# Patient Record
Sex: FEMALE | Race: BLACK OR AFRICAN AMERICAN | NOT HISPANIC OR LATINO | URBAN - METROPOLITAN AREA
[De-identification: names, ages, dates, MRNs, and addresses within clinical notes are randomized per-mention and may not be internally consistent; named-entity substitution may affect disease eponyms.]

---

## 2017-01-25 ENCOUNTER — INPATIENT (INPATIENT)
Facility: HOSPITAL | Age: 47
LOS: 1 days | Discharge: HOME | End: 2017-01-27
Attending: HOSPITALIST | Admitting: HOSPITALIST
Payer: COMMERCIAL

## 2017-01-25 VITALS
DIASTOLIC BLOOD PRESSURE: 89 MMHG | RESPIRATION RATE: 16 BRPM | OXYGEN SATURATION: 100 % | SYSTOLIC BLOOD PRESSURE: 159 MMHG | TEMPERATURE: 99 F | HEART RATE: 84 BPM

## 2017-01-25 DIAGNOSIS — K92.2 GASTROINTESTINAL HEMORRHAGE, UNSPECIFIED: ICD-10-CM

## 2017-01-25 DIAGNOSIS — Z90.49 ACQUIRED ABSENCE OF OTHER SPECIFIED PARTS OF DIGESTIVE TRACT: Chronic | ICD-10-CM

## 2017-01-25 DIAGNOSIS — D50.0 IRON DEFICIENCY ANEMIA SECONDARY TO BLOOD LOSS (CHRONIC): ICD-10-CM

## 2017-01-25 DIAGNOSIS — R10.84 GENERALIZED ABDOMINAL PAIN: ICD-10-CM

## 2017-01-25 LAB
ALBUMIN SERPL ELPH-MCNC: 4.6 G/DL — SIGNIFICANT CHANGE UP (ref 3.3–5)
ALP SERPL-CCNC: 58 U/L — SIGNIFICANT CHANGE UP (ref 40–120)
ALT FLD-CCNC: 9 U/L — SIGNIFICANT CHANGE UP (ref 4–33)
APTT BLD: 28.1 SEC — SIGNIFICANT CHANGE UP (ref 27.5–37.4)
AST SERPL-CCNC: 15 U/L — SIGNIFICANT CHANGE UP (ref 4–32)
BASOPHILS # BLD AUTO: 0.04 K/UL — SIGNIFICANT CHANGE UP (ref 0–0.2)
BASOPHILS NFR BLD AUTO: 0.5 % — SIGNIFICANT CHANGE UP (ref 0–2)
BILIRUB SERPL-MCNC: 0.2 MG/DL — SIGNIFICANT CHANGE UP (ref 0.2–1.2)
BLD GP AB SCN SERPL QL: NEGATIVE — SIGNIFICANT CHANGE UP
BUN SERPL-MCNC: 10 MG/DL — SIGNIFICANT CHANGE UP (ref 7–23)
CALCIUM SERPL-MCNC: 9.4 MG/DL — SIGNIFICANT CHANGE UP (ref 8.4–10.5)
CHLORIDE SERPL-SCNC: 100 MMOL/L — SIGNIFICANT CHANGE UP (ref 98–107)
CO2 SERPL-SCNC: 25 MMOL/L — SIGNIFICANT CHANGE UP (ref 22–31)
CREAT SERPL-MCNC: 0.88 MG/DL — SIGNIFICANT CHANGE UP (ref 0.5–1.3)
EOSINOPHIL # BLD AUTO: 0.22 K/UL — SIGNIFICANT CHANGE UP (ref 0–0.5)
EOSINOPHIL NFR BLD AUTO: 2.8 % — SIGNIFICANT CHANGE UP (ref 0–6)
FERRITIN SERPL-MCNC: 17.99 NG/ML — SIGNIFICANT CHANGE UP (ref 15–150)
GLUCOSE SERPL-MCNC: 107 MG/DL — HIGH (ref 70–99)
HCG SERPL-ACNC: < 5 MIU/ML — SIGNIFICANT CHANGE UP
HCT VFR BLD CALC: 28.6 % — LOW (ref 34.5–45)
HCT VFR BLD CALC: 29.5 % — LOW (ref 34.5–45)
HCT VFR BLD CALC: 31.1 % — LOW (ref 34.5–45)
HGB BLD-MCNC: 7.4 G/DL — LOW (ref 11.5–15.5)
HGB BLD-MCNC: 7.6 G/DL — LOW (ref 11.5–15.5)
HGB BLD-MCNC: 8.1 G/DL — LOW (ref 11.5–15.5)
IMM GRANULOCYTES NFR BLD AUTO: 0.3 % — SIGNIFICANT CHANGE UP (ref 0–1.5)
INR BLD: 1.04 — SIGNIFICANT CHANGE UP (ref 0.87–1.18)
IRON SATN MFR SERPL: 30 UG/DL — SIGNIFICANT CHANGE UP (ref 30–160)
IRON SATN MFR SERPL: 488 UG/DL — SIGNIFICANT CHANGE UP (ref 140–530)
LIDOCAIN IGE QN: 24.8 U/L — SIGNIFICANT CHANGE UP (ref 7–60)
LYMPHOCYTES # BLD AUTO: 1.81 K/UL — SIGNIFICANT CHANGE UP (ref 1–3.3)
LYMPHOCYTES # BLD AUTO: 22.6 % — SIGNIFICANT CHANGE UP (ref 13–44)
MCHC RBC-ENTMCNC: 19 PG — LOW (ref 27–34)
MCHC RBC-ENTMCNC: 19.1 PG — LOW (ref 27–34)
MCHC RBC-ENTMCNC: 19.2 PG — LOW (ref 27–34)
MCHC RBC-ENTMCNC: 25.8 % — LOW (ref 32–36)
MCHC RBC-ENTMCNC: 25.9 % — LOW (ref 32–36)
MCHC RBC-ENTMCNC: 26 % — LOW (ref 32–36)
MCV RBC AUTO: 73.7 FL — LOW (ref 80–100)
MCV RBC AUTO: 73.8 FL — LOW (ref 80–100)
MCV RBC AUTO: 73.9 FL — LOW (ref 80–100)
MONOCYTES # BLD AUTO: 0.8 K/UL — SIGNIFICANT CHANGE UP (ref 0–0.9)
MONOCYTES NFR BLD AUTO: 10 % — SIGNIFICANT CHANGE UP (ref 2–14)
NEUTROPHILS # BLD AUTO: 5.11 K/UL — SIGNIFICANT CHANGE UP (ref 1.8–7.4)
NEUTROPHILS NFR BLD AUTO: 63.8 % — SIGNIFICANT CHANGE UP (ref 43–77)
OB PNL STL: NEGATIVE — SIGNIFICANT CHANGE UP
PLATELET # BLD AUTO: 306 K/UL — SIGNIFICANT CHANGE UP (ref 150–400)
PLATELET # BLD AUTO: 310 K/UL — SIGNIFICANT CHANGE UP (ref 150–400)
PLATELET # BLD AUTO: 318 K/UL — SIGNIFICANT CHANGE UP (ref 150–400)
PMV BLD: 10.1 FL — SIGNIFICANT CHANGE UP (ref 7–13)
PMV BLD: 10.3 FL — SIGNIFICANT CHANGE UP (ref 7–13)
PMV BLD: 10.3 FL — SIGNIFICANT CHANGE UP (ref 7–13)
POTASSIUM SERPL-MCNC: 3.3 MMOL/L — LOW (ref 3.5–5.3)
POTASSIUM SERPL-SCNC: 3.3 MMOL/L — LOW (ref 3.5–5.3)
PROT SERPL-MCNC: 7.6 G/DL — SIGNIFICANT CHANGE UP (ref 6–8.3)
PROTHROM AB SERPL-ACNC: 11.9 SEC — SIGNIFICANT CHANGE UP (ref 10–13.1)
RBC # BLD: 3.88 M/UL — SIGNIFICANT CHANGE UP (ref 3.8–5.2)
RBC # BLD: 4 M/UL — SIGNIFICANT CHANGE UP (ref 3.8–5.2)
RBC # BLD: 4.21 M/UL — SIGNIFICANT CHANGE UP (ref 3.8–5.2)
RBC # FLD: 20.5 % — HIGH (ref 10.3–14.5)
RBC # FLD: 20.6 % — HIGH (ref 10.3–14.5)
RBC # FLD: 20.7 % — HIGH (ref 10.3–14.5)
RETICS #: 53.8 10X3/UL — SIGNIFICANT CHANGE UP (ref 17–73)
RETICS/RBC NFR: 1.4 % — SIGNIFICANT CHANGE UP (ref 0.5–2.5)
RH IG SCN BLD-IMP: POSITIVE — SIGNIFICANT CHANGE UP
SODIUM SERPL-SCNC: 138 MMOL/L — SIGNIFICANT CHANGE UP (ref 135–145)
UIBC SERPL-MCNC: 458 UG/DL — HIGH (ref 110–370)
WBC # BLD: 7.71 K/UL — SIGNIFICANT CHANGE UP (ref 3.8–10.5)
WBC # BLD: 7.96 K/UL — SIGNIFICANT CHANGE UP (ref 3.8–10.5)
WBC # BLD: 8 K/UL — SIGNIFICANT CHANGE UP (ref 3.8–10.5)
WBC # FLD AUTO: 7.71 K/UL — SIGNIFICANT CHANGE UP (ref 3.8–10.5)
WBC # FLD AUTO: 7.96 K/UL — SIGNIFICANT CHANGE UP (ref 3.8–10.5)
WBC # FLD AUTO: 8 K/UL — SIGNIFICANT CHANGE UP (ref 3.8–10.5)

## 2017-01-25 PROCEDURE — 71010: CPT | Mod: 26

## 2017-01-25 PROCEDURE — 99223 1ST HOSP IP/OBS HIGH 75: CPT | Mod: AI,GC

## 2017-01-25 RX ORDER — INFLUENZA VIRUS VACCINE 15; 15; 15; 15 UG/.5ML; UG/.5ML; UG/.5ML; UG/.5ML
0.5 SUSPENSION INTRAMUSCULAR ONCE
Qty: 0 | Refills: 0 | Status: DISCONTINUED | OUTPATIENT
Start: 2017-01-25 | End: 2017-01-27

## 2017-01-25 RX ORDER — MORPHINE SULFATE 50 MG/1
4 CAPSULE, EXTENDED RELEASE ORAL ONCE
Qty: 0 | Refills: 0 | Status: DISCONTINUED | OUTPATIENT
Start: 2017-01-25 | End: 2017-01-25

## 2017-01-25 RX ORDER — SODIUM CHLORIDE 9 MG/ML
1000 INJECTION, SOLUTION INTRAVENOUS
Qty: 0 | Refills: 0 | Status: DISCONTINUED | OUTPATIENT
Start: 2017-01-25 | End: 2017-01-27

## 2017-01-25 RX ORDER — ACETAMINOPHEN 500 MG
650 TABLET ORAL EVERY 6 HOURS
Qty: 0 | Refills: 0 | Status: DISCONTINUED | OUTPATIENT
Start: 2017-01-25 | End: 2017-01-27

## 2017-01-25 RX ORDER — PANTOPRAZOLE SODIUM 20 MG/1
80 TABLET, DELAYED RELEASE ORAL ONCE
Qty: 0 | Refills: 0 | Status: COMPLETED | OUTPATIENT
Start: 2017-01-25 | End: 2017-01-25

## 2017-01-25 RX ORDER — POTASSIUM CHLORIDE 20 MEQ
10 PACKET (EA) ORAL
Qty: 0 | Refills: 0 | Status: COMPLETED | OUTPATIENT
Start: 2017-01-25 | End: 2017-01-25

## 2017-01-25 RX ORDER — PANTOPRAZOLE SODIUM 20 MG/1
40 TABLET, DELAYED RELEASE ORAL
Qty: 0 | Refills: 0 | Status: DISCONTINUED | OUTPATIENT
Start: 2017-01-25 | End: 2017-01-27

## 2017-01-25 RX ADMIN — Medication 100 MILLIEQUIVALENT(S): at 15:45

## 2017-01-25 RX ADMIN — PANTOPRAZOLE SODIUM 40 MILLIGRAM(S): 20 TABLET, DELAYED RELEASE ORAL at 18:03

## 2017-01-25 RX ADMIN — MORPHINE SULFATE 4 MILLIGRAM(S): 50 CAPSULE, EXTENDED RELEASE ORAL at 04:39

## 2017-01-25 RX ADMIN — PANTOPRAZOLE SODIUM 80 MILLIGRAM(S): 20 TABLET, DELAYED RELEASE ORAL at 04:39

## 2017-01-25 RX ADMIN — MORPHINE SULFATE 4 MILLIGRAM(S): 50 CAPSULE, EXTENDED RELEASE ORAL at 07:30

## 2017-01-25 RX ADMIN — Medication 100 MILLIEQUIVALENT(S): at 11:36

## 2017-01-25 RX ADMIN — MORPHINE SULFATE 4 MILLIGRAM(S): 50 CAPSULE, EXTENDED RELEASE ORAL at 04:19

## 2017-01-25 RX ADMIN — MORPHINE SULFATE 4 MILLIGRAM(S): 50 CAPSULE, EXTENDED RELEASE ORAL at 11:36

## 2017-01-25 RX ADMIN — PANTOPRAZOLE SODIUM 40 MILLIGRAM(S): 20 TABLET, DELAYED RELEASE ORAL at 11:36

## 2017-01-25 RX ADMIN — Medication 100 MILLIEQUIVALENT(S): at 12:59

## 2017-01-25 RX ADMIN — MORPHINE SULFATE 4 MILLIGRAM(S): 50 CAPSULE, EXTENDED RELEASE ORAL at 07:15

## 2017-01-25 RX ADMIN — MORPHINE SULFATE 4 MILLIGRAM(S): 50 CAPSULE, EXTENDED RELEASE ORAL at 12:00

## 2017-01-25 RX ADMIN — SODIUM CHLORIDE 125 MILLILITER(S): 9 INJECTION, SOLUTION INTRAVENOUS at 11:36

## 2017-01-25 NOTE — ED ADULT NURSE NOTE - OBJECTIVE STATEMENT
Patient arrives A&Ox3 c/o dark tarry stools and diffuse abdominal pain for past few days. Patient states emesis is NBNB, denies any chest pain shortness of breath or dizziness. Appears comfortable. NAD noted. VS as noted. Appears comfortable. 20G IV access obtained labs drawn and sent. Awaits MD rosas. Denies bloodthinner use. Will CTM closely.

## 2017-01-25 NOTE — ED PROVIDER NOTE - OBJECTIVE STATEMENT
47 y/o F pmhx cholecystectomy in '03, current smoker, presents with nausea, vomiting, diarrhea, and dark stools for 3 days.  Patient states that the symptoms started initially on Sunday.  She went to the doctor yesterday who told her to "drink a lot of fluid."  She describes the pain as midepigastric, non-radiating, and crampy in quality.  Since Sunday she has had 3 black stools and vomited 3-4 times, although there was no blood in the vomit.  She admits to drinking a lot of coffee, soda, and eating a lot of spicy food.  She also smokes 1/2 pack of cigarettes/day.  She admits to some dizziness/lightheadedness when standing.  She denies HA, chest pain, fevers, chills, cough, or SOB. 47 y/o F pmhx cholecystectomy in '03, current smoker, presents with nausea, vomiting, diarrhea, and dark stools for 3 days.  Patient states that the symptoms started initially on Sunday.  She went to the doctor yesterday who told her to "drink a lot of fluid."  She describes the pain as midepigastric, non-radiating, and crampy in quality.  Since Sunday she has had 3 black stools and vomited 3-4 times, although there was no blood in the vomit.  She admits to drinking a lot of coffee, soda, and eating a lot of spicy food.  She also smokes 1/2 pack of cigarettes/day.  She admits to some dizziness/lightheadedness when standing.  She denies HA, chest pain, fevers, chills, cough, or SOB.  Klepfish: NBNB NV x3d, 2-3 loose black diarrhea, intermittent LUQ/epigastric pain lasting hrs, worse w/ food, no alleviating factors. No f/c, SOB/CP, urinary complaints, bleeding from elsewhere. No hx GIB or EGD. Not on AC. Minimal lightheaded w/ positional change.

## 2017-01-25 NOTE — H&P ADULT. - ATTENDING COMMENTS
46 y.o. Female smoker w/ Hx cholecystectomy in 2003 p/w 3 days of epigastric pain N/V/D w/ dark tarry stools in setting of NSAID use. Patient found to be orthostatic w/ acute blood loss anemia but Guiac negative.  Most likely gastritis. Monitoring CBC. If repeat Hb lower with transfuse 1 unit PRBCs. GI consult. Start Protonix and Lactated ringers @125ml/hr

## 2017-01-25 NOTE — ED PROVIDER NOTE - PROGRESS NOTE DETAILS
Will check FOBT, basic labs, give 1 L IVF, reevaluate. Klepfish: Guaiac negative but given symptoms, appearance of stool and lab results, still high concern for GIB and lab error. Admitting for further GI w/u. d/w pt and hospitalist, text paged mar.

## 2017-01-25 NOTE — H&P ADULT. - ASSESSMENT
47yo F with no significant PMH presenting with nausea, vomiting, abd pain and dark stools x3 days. Concerning for UGIB vs LBIG, pt HD stable at this time, presenting with Hgb of 7.6, baseline unknown.

## 2017-01-25 NOTE — ED ADULT TRIAGE NOTE - CHIEF COMPLAINT QUOTE
Pt c/o abd pain with N/V/D. States, "stool is dark, black & pain is reminiscent of gall stones, but I don't have a gall bladder anymore."

## 2017-01-25 NOTE — H&P ADULT. - PROBLEM SELECTOR PLAN 1
-Hgb baseline unknown, initial Hgb 7.6, trending down  -If Hgb continues to drop, or if orthostatics positive, will consent for blood  -Check iron studies, MCV low and RDW high  -Trend CBC q6h for now  -Started on IVF

## 2017-01-25 NOTE — H&P ADULT. - FAMILY HISTORY
Mother  Still living? Unknown  Family history of hypertension, Age at diagnosis: Age Unknown     Father  Still living? Unknown  Family history of diabetes mellitus, Age at diagnosis: Age Unknown  Family history of Parkinson's disease, Age at diagnosis: Age Unknown     Sibling  Still living? Unknown  Family history of sarcoidosis, Age at diagnosis: Age Unknown

## 2017-01-25 NOTE — ED PROVIDER NOTE - MEDICAL DECISION MAKING DETAILS
45 y/o F pmhx cholecystectomy in '03, current smoker, presents with nausea, vomiting, diarrhea, and dark stools for 3 days.  Concern for UGIB and possible viral gastroenteritis.  Will check FOBT, basic labs, give 1 L IVF, reevaluate.

## 2017-01-25 NOTE — H&P ADULT. - HISTORY OF PRESENT ILLNESS
Pt is a 47yo F with no significant PMH presenting with nausea, vomiting, abd pain and dark stools x3 days. Pt states that all of her symptoms began on Sunday. She has had 1 episode of dark stool for the past 3 days. She has never had dark stools in the past before. Her abd pain is located in the epigastric and LUQ areas, is non-radiating, 8-10/10, crampy, and intermittent for hours throughout the day. The pt has had several episodes of vomiting, the most recent at 1 AM the day of admission, which she states are non-bilious and non-bloody. Pt saw a doctor yesterday for her symptoms and was told to drink more fluids. ROS also positive for occasional heartburn and dizziness when going from a sitting to a standing position. Pt admits to trying many different medications for her nausea and abdominal pain- she listed Excedrin, ibuprofen, Deneen-seltzer, Pepto bismol, Tylenol, Aleve, and ASA up to 3x/day without much relief. 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 She drinks at least 1L of soda every day, eats plenty of spicy foods, and eats sunflower seeds as her main source of food overnight while at her job. Pt states that she does have a history of anemia and has heavy menstrual periods (LMP 1/11/17), and was on iron supplementation in the past. Denies any recent travel, sick contacts, fevers, chills, or similar symptoms in the past.    In the ED, vitals were: Temp 98.7 HR 84 /89 RR 16 O2 100% on RA. Pt was given morphine x2 and protonix 80mg IV x1. CXR showed clear lungs. Initial CBC showed a Hgb of 7.6; only past records of Hgb is a single reading of 8.8 from 2011 and pt does not know what her baseline Hgb is. FOBT in the ED was negative.

## 2017-01-25 NOTE — ED PROVIDER NOTE - ATTENDING CONTRIBUTION TO CARE
46F no PMh p/w 2-3d NV and black stool, also intermittent LUQ/epigastric pain worse w/ food. Postional lightheaded. Vitals wnl, exam notable for soft abd but luq ttp and black stool.  ddx: Likely UGIB, possibly 2/2 PUD/gastritis.  CBC, cmp, guaiac. Protonix. XR eval free air (low suspicion). Reassess,

## 2017-01-26 ENCOUNTER — RESULT REVIEW (OUTPATIENT)
Age: 47
End: 2017-01-26

## 2017-01-26 LAB
BLD GP AB SCN SERPL QL: NEGATIVE — SIGNIFICANT CHANGE UP
BUN SERPL-MCNC: 3 MG/DL — LOW (ref 7–23)
CALCIUM SERPL-MCNC: 9 MG/DL — SIGNIFICANT CHANGE UP (ref 8.4–10.5)
CHLORIDE SERPL-SCNC: 104 MMOL/L — SIGNIFICANT CHANGE UP (ref 98–107)
CO2 SERPL-SCNC: 25 MMOL/L — SIGNIFICANT CHANGE UP (ref 22–31)
CREAT SERPL-MCNC: 0.65 MG/DL — SIGNIFICANT CHANGE UP (ref 0.5–1.3)
GLUCOSE SERPL-MCNC: 89 MG/DL — SIGNIFICANT CHANGE UP (ref 70–99)
HCT VFR BLD CALC: 26 % — LOW (ref 34.5–45)
HCT VFR BLD CALC: 26.6 % — LOW (ref 34.5–45)
HCT VFR BLD CALC: 30.3 % — LOW (ref 34.5–45)
HGB BLD-MCNC: 6.6 G/DL — CRITICAL LOW (ref 11.5–15.5)
HGB BLD-MCNC: 6.8 G/DL — CRITICAL LOW (ref 11.5–15.5)
HGB BLD-MCNC: 8.2 G/DL — LOW (ref 11.5–15.5)
MAGNESIUM SERPL-MCNC: 1.9 MG/DL — SIGNIFICANT CHANGE UP (ref 1.6–2.6)
MCHC RBC-ENTMCNC: 18.8 PG — LOW (ref 27–34)
MCHC RBC-ENTMCNC: 18.8 PG — LOW (ref 27–34)
MCHC RBC-ENTMCNC: 20.1 PG — LOW (ref 27–34)
MCHC RBC-ENTMCNC: 25.4 % — LOW (ref 32–36)
MCHC RBC-ENTMCNC: 25.6 % — LOW (ref 32–36)
MCHC RBC-ENTMCNC: 27.1 % — LOW (ref 32–36)
MCV RBC AUTO: 73.7 FL — LOW (ref 80–100)
MCV RBC AUTO: 74.1 FL — LOW (ref 80–100)
MCV RBC AUTO: 74.3 FL — LOW (ref 80–100)
PHOSPHATE SERPL-MCNC: 3.5 MG/DL — SIGNIFICANT CHANGE UP (ref 2.5–4.5)
PLATELET # BLD AUTO: 255 K/UL — SIGNIFICANT CHANGE UP (ref 150–400)
PLATELET # BLD AUTO: 258 K/UL — SIGNIFICANT CHANGE UP (ref 150–400)
PLATELET # BLD AUTO: 277 K/UL — SIGNIFICANT CHANGE UP (ref 150–400)
PMV BLD: 10.7 FL — SIGNIFICANT CHANGE UP (ref 7–13)
PMV BLD: 9.6 FL — SIGNIFICANT CHANGE UP (ref 7–13)
PMV BLD: 9.7 FL — SIGNIFICANT CHANGE UP (ref 7–13)
POTASSIUM SERPL-MCNC: 3.5 MMOL/L — SIGNIFICANT CHANGE UP (ref 3.5–5.3)
POTASSIUM SERPL-SCNC: 3.5 MMOL/L — SIGNIFICANT CHANGE UP (ref 3.5–5.3)
RBC # BLD: 3.51 M/UL — LOW (ref 3.8–5.2)
RBC # BLD: 3.61 M/UL — LOW (ref 3.8–5.2)
RBC # BLD: 4.08 M/UL — SIGNIFICANT CHANGE UP (ref 3.8–5.2)
RBC # FLD: 20 % — HIGH (ref 10.3–14.5)
RBC # FLD: 20.4 % — HIGH (ref 10.3–14.5)
RBC # FLD: 20.7 % — HIGH (ref 10.3–14.5)
RH IG SCN BLD-IMP: POSITIVE — SIGNIFICANT CHANGE UP
SODIUM SERPL-SCNC: 141 MMOL/L — SIGNIFICANT CHANGE UP (ref 135–145)
WBC # BLD: 5.76 K/UL — SIGNIFICANT CHANGE UP (ref 3.8–10.5)
WBC # BLD: 6.38 K/UL — SIGNIFICANT CHANGE UP (ref 3.8–10.5)
WBC # BLD: 6.82 K/UL — SIGNIFICANT CHANGE UP (ref 3.8–10.5)
WBC # FLD AUTO: 5.76 K/UL — SIGNIFICANT CHANGE UP (ref 3.8–10.5)
WBC # FLD AUTO: 6.38 K/UL — SIGNIFICANT CHANGE UP (ref 3.8–10.5)
WBC # FLD AUTO: 6.82 K/UL — SIGNIFICANT CHANGE UP (ref 3.8–10.5)

## 2017-01-26 PROCEDURE — 99254 IP/OBS CNSLTJ NEW/EST MOD 60: CPT | Mod: GC

## 2017-01-26 PROCEDURE — 99233 SBSQ HOSP IP/OBS HIGH 50: CPT | Mod: GC

## 2017-01-26 RX ORDER — NICOTINE POLACRILEX 2 MG
1 GUM BUCCAL DAILY
Qty: 0 | Refills: 0 | Status: DISCONTINUED | OUTPATIENT
Start: 2017-01-26 | End: 2017-01-27

## 2017-01-26 RX ADMIN — Medication 650 MILLIGRAM(S): at 13:30

## 2017-01-26 RX ADMIN — SODIUM CHLORIDE 125 MILLILITER(S): 9 INJECTION, SOLUTION INTRAVENOUS at 12:23

## 2017-01-26 RX ADMIN — Medication 650 MILLIGRAM(S): at 12:34

## 2017-01-26 RX ADMIN — Medication 1 PATCH: at 12:37

## 2017-01-26 RX ADMIN — PANTOPRAZOLE SODIUM 40 MILLIGRAM(S): 20 TABLET, DELAYED RELEASE ORAL at 06:26

## 2017-01-26 RX ADMIN — PANTOPRAZOLE SODIUM 40 MILLIGRAM(S): 20 TABLET, DELAYED RELEASE ORAL at 17:37

## 2017-01-26 NOTE — PROVIDER CONTACT NOTE (CRITICAL VALUE NOTIFICATION) - ACTION/TREATMENT ORDERED:
Pt. is refusing transfusion, educated pt. on benefits of transfusion pt. states she will wait to further discuss with family members

## 2017-01-27 ENCOUNTER — TRANSCRIPTION ENCOUNTER (OUTPATIENT)
Age: 47
End: 2017-01-27

## 2017-01-27 VITALS
OXYGEN SATURATION: 100 % | DIASTOLIC BLOOD PRESSURE: 80 MMHG | HEART RATE: 87 BPM | RESPIRATION RATE: 18 BRPM | SYSTOLIC BLOOD PRESSURE: 150 MMHG | TEMPERATURE: 99 F

## 2017-01-27 LAB
BUN SERPL-MCNC: 5 MG/DL — LOW (ref 7–23)
CALCIUM SERPL-MCNC: 9.3 MG/DL — SIGNIFICANT CHANGE UP (ref 8.4–10.5)
CHLORIDE SERPL-SCNC: 103 MMOL/L — SIGNIFICANT CHANGE UP (ref 98–107)
CO2 SERPL-SCNC: 26 MMOL/L — SIGNIFICANT CHANGE UP (ref 22–31)
CREAT SERPL-MCNC: 0.63 MG/DL — SIGNIFICANT CHANGE UP (ref 0.5–1.3)
GLUCOSE SERPL-MCNC: 85 MG/DL — SIGNIFICANT CHANGE UP (ref 70–99)
HCG SERPL-ACNC: < 5 MIU/ML — SIGNIFICANT CHANGE UP
HCT VFR BLD CALC: 29.7 % — LOW (ref 34.5–45)
HCT VFR BLD CALC: 30.8 % — LOW (ref 34.5–45)
HGB A MFR BLD: 97.9 % — SIGNIFICANT CHANGE UP
HGB A2 MFR BLD: 1.8 % — LOW (ref 2.4–3.5)
HGB BLD-MCNC: 8 G/DL — LOW (ref 11.5–15.5)
HGB BLD-MCNC: 8.3 G/DL — LOW (ref 11.5–15.5)
HGB F MFR BLD: 0.3 % — SIGNIFICANT CHANGE UP (ref 0–1.5)
MAGNESIUM SERPL-MCNC: 2.1 MG/DL — SIGNIFICANT CHANGE UP (ref 1.6–2.6)
MCHC RBC-ENTMCNC: 19.9 PG — LOW (ref 27–34)
MCHC RBC-ENTMCNC: 20 PG — LOW (ref 27–34)
MCHC RBC-ENTMCNC: 26.9 % — LOW (ref 32–36)
MCHC RBC-ENTMCNC: 26.9 % — LOW (ref 32–36)
MCV RBC AUTO: 73.9 FL — LOW (ref 80–100)
MCV RBC AUTO: 74.3 FL — LOW (ref 80–100)
PHOSPHATE SERPL-MCNC: 3.4 MG/DL — SIGNIFICANT CHANGE UP (ref 2.5–4.5)
PLATELET # BLD AUTO: 250 K/UL — SIGNIFICANT CHANGE UP (ref 150–400)
PLATELET # BLD AUTO: 267 K/UL — SIGNIFICANT CHANGE UP (ref 150–400)
PMV BLD: 10 FL — SIGNIFICANT CHANGE UP (ref 7–13)
PMV BLD: 10.1 FL — SIGNIFICANT CHANGE UP (ref 7–13)
POTASSIUM SERPL-MCNC: 3.6 MMOL/L — SIGNIFICANT CHANGE UP (ref 3.5–5.3)
POTASSIUM SERPL-SCNC: 3.6 MMOL/L — SIGNIFICANT CHANGE UP (ref 3.5–5.3)
RBC # BLD: 4 M/UL — SIGNIFICANT CHANGE UP (ref 3.8–5.2)
RBC # BLD: 4.17 M/UL — SIGNIFICANT CHANGE UP (ref 3.8–5.2)
RBC # FLD: 19.8 % — HIGH (ref 10.3–14.5)
RBC # FLD: 20 % — HIGH (ref 10.3–14.5)
SODIUM SERPL-SCNC: 141 MMOL/L — SIGNIFICANT CHANGE UP (ref 135–145)
WBC # BLD: 5.77 K/UL — SIGNIFICANT CHANGE UP (ref 3.8–10.5)
WBC # BLD: 5.83 K/UL — SIGNIFICANT CHANGE UP (ref 3.8–10.5)
WBC # FLD AUTO: 5.77 K/UL — SIGNIFICANT CHANGE UP (ref 3.8–10.5)
WBC # FLD AUTO: 5.83 K/UL — SIGNIFICANT CHANGE UP (ref 3.8–10.5)

## 2017-01-27 PROCEDURE — 88305 TISSUE EXAM BY PATHOLOGIST: CPT | Mod: 26

## 2017-01-27 PROCEDURE — 99239 HOSP IP/OBS DSCHRG MGMT >30: CPT | Mod: 25

## 2017-01-27 PROCEDURE — 43239 EGD BIOPSY SINGLE/MULTIPLE: CPT | Mod: GC

## 2017-01-27 RX ORDER — PANTOPRAZOLE SODIUM 20 MG/1
40 TABLET, DELAYED RELEASE ORAL
Qty: 0 | Refills: 0 | Status: DISCONTINUED | OUTPATIENT
Start: 2017-01-28 | End: 2017-01-27

## 2017-01-27 RX ORDER — PANTOPRAZOLE SODIUM 20 MG/1
1 TABLET, DELAYED RELEASE ORAL
Qty: 30 | Refills: 0 | OUTPATIENT
Start: 2017-01-27 | End: 2017-02-26

## 2017-01-27 RX ADMIN — SODIUM CHLORIDE 125 MILLILITER(S): 9 INJECTION, SOLUTION INTRAVENOUS at 01:37

## 2017-01-27 RX ADMIN — SODIUM CHLORIDE 125 MILLILITER(S): 9 INJECTION, SOLUTION INTRAVENOUS at 11:20

## 2017-01-27 RX ADMIN — Medication 1 PATCH: at 13:58

## 2017-01-27 RX ADMIN — Medication 1 DROP(S): at 11:23

## 2017-01-27 RX ADMIN — PANTOPRAZOLE SODIUM 40 MILLIGRAM(S): 20 TABLET, DELAYED RELEASE ORAL at 05:26

## 2017-01-27 RX ADMIN — Medication 1 PATCH: at 11:50

## 2017-01-27 NOTE — DISCHARGE NOTE ADULT - OTHER SIGNIFICANT FINDINGS
Upper endoscopy:    Impression:    - Normal esophagus.                       - Z-line regular, 38 cm from the incisors.                       - Gastritis with gastric erosions.                       - Normal duodenal bulb and 2nd part of the duodenum.                        Biopsied.

## 2017-01-27 NOTE — DISCHARGE NOTE ADULT - CARE PLAN
Principal Discharge DX:	Anemia due to blood loss  Goal:	Improve hemoglobin counts  Instructions for follow-up, activity and diet:	You were hospitalized for having symptoms from anemia, likely due to acute bleeding. The cause of your blood loss most likely was from the GI tract. Your hemoglobin levels were monitored while you were in the hospital and they remained very low. You received 1 unit of blood and your hemoglobin remained stable afterward. Please follow up with your PMD for further workup of your anemia.  Secondary Diagnosis:	Superficial gastritis, presence of bleeding unspecified, unspecified chronicity  Goal:	Resolution  Instructions for follow-up, activity and diet:	An endoscopy done while you were in the hospital showed that you have gastritis, which is an inflammation of your stomach. Certain foods can worsen gastritis, such as spicy foods. Gastritis can also be caused by a bacteria called H. Pylori, which you were tested for. A biopsy of your stomach was also sent to the lab for further testing. You were started on a medication called Protonix, which decreases the amount of acid in your stomach in order to treat gastritis. You will be discharged on this medication to take once daily. Please follow up with the gastroenterology clinic for a capsule endoscopy to be performed, which is similar to a regular endoscopy but is able to look further into your small bowel.  Secondary Diagnosis:	Gastrointestinal hemorrhage, unspecified gastrointestinal hemorrhage type  Goal:	Resolution  Instructions for follow-up, activity and diet:	The endoscopy that was done while you were in the hospital did not find any active signs of bleeding from within your stomach. It is possible that you had bleeding from your stomach days ago, which has now resolved. You may also have other sources of bleeding from other parts of your small intestine. Please make an appt with the gastroenterology clinic to have a capsule endoscopy to be done as an outpatient.

## 2017-01-27 NOTE — DISCHARGE NOTE ADULT - PATIENT PORTAL LINK FT
“You can access the FollowHealth Patient Portal, offered by [facility], by registering with the following website: [email or web address]”

## 2017-01-27 NOTE — DISCHARGE NOTE ADULT - SECONDARY DIAGNOSIS.
Superficial gastritis, presence of bleeding unspecified, unspecified chronicity Gastrointestinal hemorrhage, unspecified gastrointestinal hemorrhage type

## 2017-01-27 NOTE — DISCHARGE NOTE ADULT - ADDITIONAL INSTRUCTIONS
Please make an appt with the gastroenterology clinic to set up an appt for a capsule endoscopy study.    Please make an appt with your PMD for further workup of your anemia.

## 2017-01-27 NOTE — DISCHARGE NOTE ADULT - HOSPITAL COURSE
Pt is a 47yo F with no significant PMH presenting with nausea, vomiting, abd pain and dark stools x3 days. Pt states that all of her symptoms began on Sunday. She has had 1 episode of dark stool for the past 3 days. She has never had dark stools in the past before. Her abd pain is located in the epigastric and LUQ areas, is non-radiating, 8-10/10, crampy, and intermittent for hours throughout the day. The pt has had several episodes of vomiting, the most recent at 1 AM the day of admission, which she states are non-bilious and non-bloody. Pt saw a doctor yesterday for her symptoms and was told to drink more fluids. ROS also positive for occasional heartburn and dizziness when going from a sitting to a standing position. Pt admits to trying many different medications for her nausea and abdominal pain- she listed Excedrin, ibuprofen, Deneen-seltzer, Pepto bismol, Tylenol, Aleve, and ASA up to 3x/day without much relief. 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 She drinks at least 1L of soda every day, eats plenty of spicy foods, and eats sunflower seeds as her main source of food overnight while at her job. Pt states that she does have a history of anemia and has heavy menstrual periods (LMP 1/11/17), and was on iron supplementation in the past. Denies any recent travel, sick contacts, fevers, chills, or similar symptoms in the past.    In the ED, vitals were: Temp 98.7 HR 84 /89 RR 16 O2 100% on RA. Pt was given morphine x2 and protonix 80mg IV x1. CXR showed clear lungs. Initial CBC showed a Hgb of 7.6; only past records of Hgb is a single reading of 8.8 from 2011 and pt does not know what her baseline Hgb is. FOBT in the ED was negative.    Pt was admitted to the medicine floor. GI was consulted. Hgb levels were monitored, and pt's Hgb dropped to 6.6 1 day after admission. Pt initially refused to sign for a blood transfusion; however, GI was unwilling to perform an EGD while pt's Hgb levels were less than 7, and so pt agreed to sign consent for blood and she was transfused 1u PRBCs with an improvement in Hgb to 8. EGD showed gastritis with ulcerations but no signs of active bleeding or ulcers. Pt was continued on Protonix and it was recommended that she follow up as an outpatient for a capsule endoscopy. Pt tolerated her PO diet post-EGD and she was discharged home.

## 2017-01-27 NOTE — DISCHARGE NOTE ADULT - PROVIDER TOKENS
FREE:[LAST:[Lester],FIRST:[Marc],PHONE:[phone]],FAX:[(   )    -],ADDRESS:[[address] [place] [address]]] FREE:[LAST:[[place]],FIRST:[[place]],PHONE:[phone]],FAX:[(   )    -],ADDRESS:[[address] [place] [address]]],FREE:[LAST:[Gastroenterology],FIRST:[Clinic],PHONE:[phone]],FAX:[(   )    -],ADDRESS:[[address] [place] [address]]]

## 2017-01-27 NOTE — DISCHARGE NOTE ADULT - CARE PROVIDER_API CALL
Marc Batista  [address] [place] [address]  Phone: [phone]  Fax: (   )    - Marc Batista  [address] [place] [address]  Phone: [phone]  Fax: (   )    -    Gastroenterology, Clinic  [address] [place] [id number]  Phone: [phone]  Fax: (   )    -

## 2017-01-27 NOTE — DISCHARGE NOTE ADULT - PLAN OF CARE
Improve hemoglobin counts You were hospitalized for having symptoms from anemia, likely due to acute bleeding. The cause of your blood loss most likely was from the GI tract. Your hemoglobin levels were monitored while you were in the hospital and they remained very low. You received 1 unit of blood and your hemoglobin remained stable afterward. Please follow up with your PMD for further workup of your anemia. Resolution An endoscopy done while you were in the hospital showed that you have gastritis, which is an inflammation of your stomach. Certain foods can worsen gastritis, such as spicy foods. Gastritis can also be caused by a bacteria called H. Pylori, which you were tested for. A biopsy of your stomach was also sent to the lab for further testing. You were started on a medication called Protonix, which decreases the amount of acid in your stomach in order to treat gastritis. You will be discharged on this medication to take once daily. Please follow up with the gastroenterology clinic for a capsule endoscopy to be performed, which is similar to a regular endoscopy but is able to look further into your small bowel. The endoscopy that was done while you were in the hospital did not find any active signs of bleeding from within your stomach. It is possible that you had bleeding from your stomach days ago, which has now resolved. You may also have other sources of bleeding from other parts of your small intestine. Please make an appt with the gastroenterology clinic to have a capsule endoscopy to be done as an outpatient.

## 2017-01-27 NOTE — DISCHARGE NOTE ADULT - MEDICATION SUMMARY - MEDICATIONS TO TAKE
I will START or STAY ON the medications listed below when I get home from the hospital:    pantoprazole 40 mg oral delayed release tablet  -- 1 tab(s) by mouth once a day (before breakfast)  -- Indication: For Gastritis

## 2017-01-30 LAB
H PYLORI IGG SER-ACNC: <5 UNITS — SIGNIFICANT CHANGE UP
HGB ELECT COMMENT: SIGNIFICANT CHANGE UP

## 2017-01-31 LAB — SURGICAL PATHOLOGY STUDY: SIGNIFICANT CHANGE UP

## 2017-03-01 ENCOUNTER — APPOINTMENT (OUTPATIENT)
Dept: GASTROENTEROLOGY | Facility: CLINIC | Age: 47
End: 2017-03-01

## 2017-03-01 ENCOUNTER — LABORATORY RESULT (OUTPATIENT)
Age: 47
End: 2017-03-01

## 2017-03-01 VITALS
DIASTOLIC BLOOD PRESSURE: 80 MMHG | BODY MASS INDEX: 37.56 KG/M2 | TEMPERATURE: 98.3 F | RESPIRATION RATE: 16 BRPM | OXYGEN SATURATION: 99 % | HEIGHT: 63 IN | SYSTOLIC BLOOD PRESSURE: 120 MMHG | WEIGHT: 212 LBS | HEART RATE: 100 BPM

## 2017-03-01 DIAGNOSIS — Z78.9 OTHER SPECIFIED HEALTH STATUS: ICD-10-CM

## 2017-03-01 DIAGNOSIS — E66.9 OBESITY, UNSPECIFIED: ICD-10-CM

## 2017-03-01 DIAGNOSIS — K80.20 CALCULUS OF GALLBLADDER W/OUT CHOLECYSTITIS W/OUT OBSTRUCTION: ICD-10-CM

## 2017-03-01 DIAGNOSIS — Z72.0 TOBACCO USE: ICD-10-CM

## 2017-03-01 DIAGNOSIS — D64.9 ANEMIA, UNSPECIFIED: ICD-10-CM

## 2017-03-01 DIAGNOSIS — Z80.0 FAMILY HISTORY OF MALIGNANT NEOPLASM OF DIGESTIVE ORGANS: ICD-10-CM

## 2017-03-01 DIAGNOSIS — F17.200 NICOTINE DEPENDENCE, UNSPECIFIED, UNCOMPLICATED: ICD-10-CM

## 2017-03-12 LAB
BASOPHILS # BLD AUTO: 0.01 K/UL
BASOPHILS NFR BLD AUTO: 0.1 %
EOSINOPHIL # BLD AUTO: 0.22 K/UL
EOSINOPHIL NFR BLD AUTO: 3.2 %
FERRITIN SERPL-MCNC: 3.7 NG/ML
FOLATE SERPL-MCNC: 17.8 NG/ML
HCT VFR BLD CALC: 29.9 %
HGB BLD-MCNC: 8 G/DL
IGA SER QL IEP: 81 MG/DL
IMM GRANULOCYTES NFR BLD AUTO: 0.1 %
IRON SATN MFR SERPL: 4 %
IRON SERPL-MCNC: 15 UG/DL
LYMPHOCYTES # BLD AUTO: 1.96 K/UL
LYMPHOCYTES NFR BLD AUTO: 28.9 %
MAN DIFF?: NORMAL
MCHC RBC-ENTMCNC: 20.1 PG
MCHC RBC-ENTMCNC: 26.8 GM/DL
MCV RBC AUTO: 74.9 FL
MONOCYTES # BLD AUTO: 0.44 K/UL
MONOCYTES NFR BLD AUTO: 6.5 %
NEUTROPHILS # BLD AUTO: 4.14 K/UL
NEUTROPHILS NFR BLD AUTO: 61.2 %
PLATELET # BLD AUTO: 338 K/UL
RBC # BLD: 3.99 M/UL
RBC # FLD: 21.6 %
TIBC SERPL-MCNC: 410 UG/DL
TTG IGA SER IA-ACNC: <5 UNITS
TTG IGA SER-ACNC: NEGATIVE
UIBC SERPL-MCNC: 395 UG/DL
VIT B12 SERPL-MCNC: 312 PG/ML
WBC # FLD AUTO: 6.78 K/UL

## 2017-05-08 ENCOUNTER — APPOINTMENT (OUTPATIENT)
Dept: GASTROENTEROLOGY | Facility: HOSPITAL | Age: 47
End: 2017-05-08

## 2018-07-26 PROBLEM — Z80.0 FAMILY HISTORY OF COLON CANCER: Status: INACTIVE | Noted: 2017-03-01

## 2019-02-14 NOTE — H&P ADULT. - LIVES WITH, PROFILE
GASTROINTESTINAL - ADULT    • Minimal or absence of nausea and vomiting Progressing    • Maintains or returns to baseline bowel function Progressing        PAIN - ADULT    • Verbalizes/displays adequate comfort level or patient's stated pain goal Progressi children

## 2019-04-13 ENCOUNTER — EMERGENCY (EMERGENCY)
Facility: HOSPITAL | Age: 49
LOS: 1 days | Discharge: HOME | End: 2019-04-13
Attending: EMERGENCY MEDICINE | Admitting: EMERGENCY MEDICINE
Payer: COMMERCIAL

## 2019-04-13 VITALS
RESPIRATION RATE: 14 BRPM | TEMPERATURE: 99 F | HEART RATE: 99 BPM | SYSTOLIC BLOOD PRESSURE: 184 MMHG | OXYGEN SATURATION: 100 % | DIASTOLIC BLOOD PRESSURE: 99 MMHG

## 2019-04-13 VITALS
OXYGEN SATURATION: 100 % | RESPIRATION RATE: 15 BRPM | HEART RATE: 83 BPM | DIASTOLIC BLOOD PRESSURE: 82 MMHG | TEMPERATURE: 98 F | SYSTOLIC BLOOD PRESSURE: 159 MMHG

## 2019-04-13 DIAGNOSIS — Z90.49 ACQUIRED ABSENCE OF OTHER SPECIFIED PARTS OF DIGESTIVE TRACT: Chronic | ICD-10-CM

## 2019-04-13 LAB
ALBUMIN SERPL ELPH-MCNC: 4.8 G/DL — SIGNIFICANT CHANGE UP (ref 3.3–5)
ALP SERPL-CCNC: 71 U/L — SIGNIFICANT CHANGE UP (ref 40–120)
ALT FLD-CCNC: 15 U/L — SIGNIFICANT CHANGE UP (ref 4–33)
ANION GAP SERPL CALC-SCNC: 12 MMO/L — SIGNIFICANT CHANGE UP (ref 7–14)
APPEARANCE UR: CLEAR — SIGNIFICANT CHANGE UP
AST SERPL-CCNC: 15 U/L — SIGNIFICANT CHANGE UP (ref 4–32)
BACTERIA # UR AUTO: NEGATIVE — SIGNIFICANT CHANGE UP
BILIRUB SERPL-MCNC: < 0.2 MG/DL — LOW (ref 0.2–1.2)
BILIRUB UR-MCNC: NEGATIVE — SIGNIFICANT CHANGE UP
BLOOD UR QL VISUAL: HIGH
BUN SERPL-MCNC: 7 MG/DL — SIGNIFICANT CHANGE UP (ref 7–23)
CALCIUM SERPL-MCNC: 9.4 MG/DL — SIGNIFICANT CHANGE UP (ref 8.4–10.5)
CHLORIDE SERPL-SCNC: 106 MMOL/L — SIGNIFICANT CHANGE UP (ref 98–107)
CO2 SERPL-SCNC: 24 MMOL/L — SIGNIFICANT CHANGE UP (ref 22–31)
COLOR SPEC: YELLOW — SIGNIFICANT CHANGE UP
CREAT SERPL-MCNC: 0.79 MG/DL — SIGNIFICANT CHANGE UP (ref 0.5–1.3)
GLUCOSE SERPL-MCNC: 102 MG/DL — HIGH (ref 70–99)
GLUCOSE UR-MCNC: NEGATIVE — SIGNIFICANT CHANGE UP
HCT VFR BLD CALC: 33 % — LOW (ref 34.5–45)
HGB BLD-MCNC: 9.4 G/DL — LOW (ref 11.5–15.5)
HYALINE CASTS # UR AUTO: NEGATIVE — SIGNIFICANT CHANGE UP
KETONES UR-MCNC: NEGATIVE — SIGNIFICANT CHANGE UP
LEUKOCYTE ESTERASE UR-ACNC: NEGATIVE — SIGNIFICANT CHANGE UP
MCHC RBC-ENTMCNC: 23.6 PG — LOW (ref 27–34)
MCHC RBC-ENTMCNC: 28.5 % — LOW (ref 32–36)
MCV RBC AUTO: 82.9 FL — SIGNIFICANT CHANGE UP (ref 80–100)
NITRITE UR-MCNC: NEGATIVE — SIGNIFICANT CHANGE UP
NRBC # FLD: 0 K/UL — SIGNIFICANT CHANGE UP (ref 0–0)
PH UR: 6 — SIGNIFICANT CHANGE UP (ref 5–8)
PLATELET # BLD AUTO: 330 K/UL — SIGNIFICANT CHANGE UP (ref 150–400)
PMV BLD: 9.9 FL — SIGNIFICANT CHANGE UP (ref 7–13)
POTASSIUM SERPL-MCNC: 3.5 MMOL/L — SIGNIFICANT CHANGE UP (ref 3.5–5.3)
POTASSIUM SERPL-SCNC: 3.5 MMOL/L — SIGNIFICANT CHANGE UP (ref 3.5–5.3)
PROT SERPL-MCNC: 7.6 G/DL — SIGNIFICANT CHANGE UP (ref 6–8.3)
PROT UR-MCNC: 10 — SIGNIFICANT CHANGE UP
RBC # BLD: 3.98 M/UL — SIGNIFICANT CHANGE UP (ref 3.8–5.2)
RBC # FLD: 19.5 % — HIGH (ref 10.3–14.5)
RBC CASTS # UR COMP ASSIST: HIGH (ref 0–?)
SODIUM SERPL-SCNC: 142 MMOL/L — SIGNIFICANT CHANGE UP (ref 135–145)
SP GR SPEC: 1.02 — SIGNIFICANT CHANGE UP (ref 1–1.04)
SQUAMOUS # UR AUTO: SIGNIFICANT CHANGE UP
TROPONIN T, HIGH SENSITIVITY: < 6 NG/L — SIGNIFICANT CHANGE UP (ref ?–14)
UROBILINOGEN FLD QL: NORMAL — SIGNIFICANT CHANGE UP
WBC # BLD: 6.36 K/UL — SIGNIFICANT CHANGE UP (ref 3.8–10.5)
WBC # FLD AUTO: 6.36 K/UL — SIGNIFICANT CHANGE UP (ref 3.8–10.5)
WBC UR QL: SIGNIFICANT CHANGE UP (ref 0–?)

## 2019-04-13 PROCEDURE — 99284 EMERGENCY DEPT VISIT MOD MDM: CPT | Mod: 25

## 2019-04-13 PROCEDURE — 71046 X-RAY EXAM CHEST 2 VIEWS: CPT | Mod: 26

## 2019-04-13 RX ORDER — IBUPROFEN 200 MG
600 TABLET ORAL ONCE
Qty: 0 | Refills: 0 | Status: COMPLETED | OUTPATIENT
Start: 2019-04-13 | End: 2019-04-13

## 2019-04-13 RX ADMIN — Medication 600 MILLIGRAM(S): at 09:49

## 2019-04-13 NOTE — ED ADULT TRIAGE NOTE - CHIEF COMPLAINT QUOTE
Pt reports waking up with L sided shoulder pain down arm since this AM. Hx of anemia. Pt appears comfortable. Denies medical hx.

## 2019-04-13 NOTE — ED ADULT NURSE NOTE - NSIMPLEMENTINTERV_GEN_ALL_ED
Implemented All Universal Safety Interventions:  [place] to call system. Call bell, personal items and telephone within reach. Instruct patient to call for assistance. Room bathroom lighting operational. Non-slip footwear when patient is off stretcher. Physically safe environment: no spills, clutter or unnecessary equipment. Stretcher in lowest position, wheels locked, appropriate side rails in place.

## 2019-04-13 NOTE — ED PROVIDER NOTE - FAMILY HISTORY
Family history of hypertension     Family history of diabetes mellitus     Family history of Parkinson's disease     Sibling  Still living? Unknown  Family history of sarcoidosis, Age at diagnosis: Age Unknown

## 2019-04-13 NOTE — ED PROVIDER NOTE - PROGRESS NOTE DETAILS
trop wnl, cxr unremarkable labs unremarkable. melissa dc with pmd follow and return precautions for acs

## 2019-04-13 NOTE — ED PROVIDER NOTE - CLINICAL SUMMARY MEDICAL DECISION MAKING FREE TEXT BOX
left shoulder pain concern for msk pain vs acs vs pna vs ptx. will check labs trop cxr reassess. doubt pe given no sob and patrick.  no leg swelling.

## 2019-04-13 NOTE — ED PROVIDER NOTE - OBJECTIVE STATEMENT
48 year old female denies pmh presents with 1 day of left shoulder pain. described as dull ache. awoke with pain. no trauma. no fever no sob no patrick.  no prior episodes. no neuro deficits.

## 2019-04-13 NOTE — ED ADULT NURSE NOTE - OBJECTIVE STATEMENT
pt woke up with back pain, radiating to left shoulder. no trauma experienced. 20g IV in right AC. Labs sent. pt waiting for xray. pt is a smoker

## 2020-11-07 ENCOUNTER — EMERGENCY (EMERGENCY)
Facility: HOSPITAL | Age: 50
LOS: 1 days | Discharge: HOME | End: 2020-11-07
Attending: EMERGENCY MEDICINE | Admitting: EMERGENCY MEDICINE
Payer: COMMERCIAL

## 2020-11-07 VITALS
HEART RATE: 100 BPM | RESPIRATION RATE: 15 BRPM | TEMPERATURE: 98 F | SYSTOLIC BLOOD PRESSURE: 145 MMHG | OXYGEN SATURATION: 100 % | DIASTOLIC BLOOD PRESSURE: 77 MMHG | HEIGHT: 63 IN

## 2020-11-07 DIAGNOSIS — Z90.49 ACQUIRED ABSENCE OF OTHER SPECIFIED PARTS OF DIGESTIVE TRACT: Chronic | ICD-10-CM

## 2020-11-07 PROCEDURE — 99283 EMERGENCY DEPT VISIT LOW MDM: CPT

## 2020-11-07 RX ORDER — CEPHALEXIN 500 MG
1 CAPSULE ORAL
Qty: 14 | Refills: 0
Start: 2020-11-07 | End: 2020-11-13

## 2020-11-07 RX ORDER — CEPHALEXIN 500 MG
500 CAPSULE ORAL ONCE
Refills: 0 | Status: COMPLETED | OUTPATIENT
Start: 2020-11-07 | End: 2020-11-07

## 2020-11-07 RX ORDER — ACETAMINOPHEN 500 MG
650 TABLET ORAL ONCE
Refills: 0 | Status: COMPLETED | OUTPATIENT
Start: 2020-11-07 | End: 2020-11-07

## 2020-11-07 RX ADMIN — Medication 500 MILLIGRAM(S): at 06:37

## 2020-11-07 RX ADMIN — Medication 650 MILLIGRAM(S): at 06:32

## 2020-11-07 NOTE — ED PROVIDER NOTE - PATIENT PORTAL LINK FT
You can access the FollowMyHealth Patient Portal offered by [facility] by registering at the following website: [email or web address]. By joining [email or web address]’s FollowMyHealth portal, you will also be able to view your health information using other applications (apps) compatible with our system.

## 2020-11-07 NOTE — ED PROVIDER NOTE - PHYSICAL EXAMINATION
Gen: NAD, AOx3, able to make needs known, non-toxic  Head: NCAT  HEENT: EOMI, oral mucosa moist, normal conjunctiva  Lung: CTAB, no respiratory distress, no wheezes/rhonchi/rales B/L, speaking in full sentences  CV: RRR, no murmurs  Abd: soft, NTND, no guarding  MSK: no visible deformities. Negative phalen's test. +tinnel's on R wrist. L 2nd toe w/ mild erythema and tenderness on palpation, slightly edematous compared to other toes  Neuro: Appears non focal. Symmetric sensation upper extremities  Skin: Warm, well perfused  Psych: normal affect Gen: NAD, AOx3, able to make needs known, non-toxic  Head: NCAT  HEENT: EOMI, oral mucosa moist, normal conjunctiva  Lung: CTAB, no respiratory distress, no wheezes/rhonchi/rales B/L, speaking in full sentences  CV: RRR, no murmurs  Abd: soft, NTND, no guarding  MSK: no visible deformities. Negative phalen's test. +tinnel's on R wrist. L 2nd toe w/ mild erythema and tenderness on palpation, slightly edematous and tender compared to other toes  Neuro: Appears non focal. Symmetric sensation upper extremities  Skin: Warm, well perfused  Psych: normal affect

## 2020-11-07 NOTE — ED ADULT TRIAGE NOTE - CHIEF COMPLAINT QUOTE
Pt c/o L middle toe swelling with pain, denies injury.  Pt also c/o B/L thumb pain, generalized body pain, and R arm numbness that started approx 12AM.  No facial droop noted, no neuro deficits noted.  Denies any dizziness/vision changes/HA.  Denies any PMHx.

## 2020-11-07 NOTE — ED PROVIDER NOTE - CLINICAL SUMMARY MEDICAL DECISION MAKING FREE TEXT BOX
Chief Complaint  PT  CAME INTO THE OFFICE FOR A NURSE VISIT FOR ADMINISTRATION OF VITAMIN B12 ORDERED BY DR Juancarlos Alcazar ON 8-  PT  BROUGHT HER OWN SERUM AND 1 ML WAS GIVEN IN RIGHT DELTOID  Active Problems    1  Allergic rhinitis due to pollen (477 0) (J30 1)   2  Breast pain, right (611 71) (N64 4)   3  Chronic atrial fibrillation (427 31) (I48 2)   4  Chronic kidney disease, stage 3 (585 3) (N18 3)   5  Cough (786 2) (R05)   6  Cystocele, midline (618 01) (N81 11)   7  Encounter for Holly catheter removal (V53 6) (Z46 6)   8  Encounter for screening mammogram for breast cancer (V76 12) (Z12 31)   9  Fatigue (780 79) (R53 83)   10  GERD without esophagitis (530 81) (K21 9)   11  History of atrial fibrillation (V12 59) (Z86 79)   12  Hyperlipidemia (272 4) (E78 5)   13  Hypertension (401 9) (I10)   14  Lateral epicondylitis, right elbow (726 32) (M77 11)   15  Left atrial enlargement (429 3) (I51 7)   16  Left renal artery stenosis (440 1) (I70 1)   17  Lipoma of right upper extremity (214 8) (D17 21)   18  Low back pain (724 2) (M54 5)   19  Lump or mass in breast (611 72) (N63)   20  Need for prophylactic vaccination and inoculation against influenza (V04 81) (Z23)   21  Need for vaccination with 13-polyvalent pneumococcal conjugate vaccine (V03 82) (Z23)   22  Onychomycosis of toenail (110 1) (B35 1)   23  Osteoarthritis of hip (715 95) (M16 9)   24  Osteoarthrosis, hand (715 94) (M19 049)   25  Osteopenia (733 90) (M85 80)   26  Pain due to onychomycosis of toenails of both feet (110 1,729 5)    (B35 1,M79 675,M79 674)   27  Pernicious anemia (281 0) (D51 0)   28  Right knee pain (719 46) (M25 561)   29  Seasonal affective disorder (296 99) (F33 9)   30  Shortness of breath (786 05) (R06 02)   31  Skin pustule (686 9) (L08 9)   32  Tubulovillous adenoma (229 9) (D36 9)   33  Vaginal wall prolapse (618 00) (N81 10)   34  Varicose vein (454 9) (I86 8)   35   Vitamin B12 deficiency (266 2) (E53 8)    Current Meds   1  Benicar 40 MG Oral Tablet; TAKE 1 TABLET DAILY; Therapy: 61Msy0554 to (Last Rx:52Xfa7592)  Requested for: 83XCH3773 Ordered   2  Benzonatate 100 MG Oral Capsule; TAKE 1 CAPSULE 3 TIMES DAILY AS NEEDED; Therapy: 95RFO8014 to (Evaluate:22Hex8787)  Requested for: 63AMA9544; Last   Rx:85Riy8788 Ordered   3  Bystolic 5 MG Oral Tablet; Therapy: (Recorded:02Naz4301) to Recorded   4  Calcium 500 +D 500-400 MG-UNIT Oral Tablet; Therapy: (FTQWRPUJ:95IAZ8000) to Recorded   5  Ciclopirox 8 % External Solution; APPLY AND GENTLY MASSAGE INTO AFFECTED   AREA(S) TWICE DAILY; Therapy: 93PXD5868 to (Last Rx:24Pgs7860) Ordered   6  Cyanocobalamin 1000 MCG/ML Injection Solution; INJECT 1 ML INTRAMUSCULARLY   ONCE A MONTH  Requested for: 17Aug2016; Last Rx:02Xsa8715 Ordered   7  Fluticasone Propionate 50 MCG/ACT Nasal Suspension; instill 2 sprays into each nostril   once daily; Therapy: 62OFR7559 to (Evaluate:91Lzk3285)  Requested for: 62GDX6881; Last   Rx:58Tvd8542 Ordered   8  Indapamide 2 5 MG Oral Tablet; TAKE 1 TABLET DAILY; Therapy: 96KTV8826 to (Evaluate:17Kjz3229); Last Rx:12Ift2206 Ordered   9  Magnesium Oxide 400 MG Oral Capsule; Take 2 daily; Therapy: 34QUZ6014 to (Last Rx:18Feb2016)  Requested for: 66ZUP4458 Ordered   10  Magnesium Oxide 400 MG Oral Capsule; TAKE AS DIRECTED; Therapy: 35QXF2116 to (Last Rx:19Yee9989) Ordered   11  Pantoprazole Sodium 40 MG Oral Tablet Delayed Release; TAKE 1 TABLET BY MOUTH    ONCE DAILY; Therapy: 99EBY2886 to (Evaluate:65Pfv2011)  Requested for: 75RDG1819; Last    Rx:53Ddz6932 Ordered   12  Premarin 0 625 MG/GM Vaginal Cream; Aplique un poco de crema en el dedo y eche en    la vagina todas las noches por 2 semanas y despues wilver noche si y [place] no  No use    aplicador; Therapy: 60QUW6237 to (Last Rx:46Tvz3547) Ordered   13  Spironolactone 25 MG Oral Tablet;     Therapy: 71Fat0725 to (Last Juan F Garcia)  Requested for: 44Zsb7532 Ordered 14  Verapamil HCl - 120 MG Oral Tablet; 1 tab po qod; Therapy: 51JTM9112 to (Last Rx:03Bwj3524) Ordered   15  Xarelto 15 MG Oral Tablet; Take 1 tablet daily; Therapy: 34FQK2231 to (Evaluate:10Nov2017)  Requested for: 54DLW0370; Last    Rx:71Nnc2198 Ordered   16  ZyrTEC Allergy 10 MG Oral Capsule; take 1 capsule daily; Therapy: 30VLA3925 to (Evaluate:14Oct2017)  Requested for: 05YIH8382; Last    Rx:69Qig7377 Ordered    Allergies    1  No Known Drug Allergies    2  Adhesive Tape   3   No Known Environmental Allergies    Future Appointments    Date/Time Provider Specialty Site   07/18/2017 10:00 AM [place], PCP Nurse Schedule  [address] PCP   06/27/2017 09:10 AM Specialty Clinic, Podiatry  [facility]   09/19/2017 11:15 AM Dawn Don DO Internal Medicine [address] PCP     Signatures   Electronically signed by : Salma Acosta, ; Jun 19 2017  9:24AM EST                       (Author)    Electronically signed by : Mishel Marlow DO; Jun 19 2017  1:55PM EST                       (Author) 51 y/o F w/ PMH of GERD presenting w/ hand and toe pain. Pt overall well appearing on exam. R wrist symptoms likely carpal tunnel given +tinnel's. Possible L as well given L thumb pain. Toe pain possible early cellulitis given mild erythema and tenderness. Will provide analgesia and abx. Will plan for DC w/ PCP f/u info, abx, and carpal tunnel management info. Will reassess the need for additional interventions as clinically warranted. 51 y/o F w/ PMH of GERD presenting w/ hand/wrist and toe pain. Pt overall well appearing on exam. R wrist symptoms c/w carpal tunnel given +tinnel's and presentation. Possible L as well given L thumb pain. Toe pain possible early cellulitis given mild erythema and tenderness. Will provide analgesia and abx. Will plan for DC w/ PCP f/u info, abx, and carpal tunnel management info. Will reassess the need for additional interventions as clinically warranted.

## 2020-11-07 NOTE — ED PROVIDER NOTE - NSFOLLOWUPINSTRUCTIONS_ED_ALL_ED_FT
You may have a soft tissue infection of the toe.  Take Cephalexin as prescribed (antibiotic).  Also, you may have carpal tunnel syndrome of the right, and possibly left, wrist. You may try a wrist splint from any drug store. Also, you should see a hand surgeon or an orthopedist, or speak with your primary care doctor about your wrist symptoms since you may benefit from further testing and/or prescription splints and/or occupational therapy.     Take Ibuprofen 400 mg every 6 hours as needed for wrist pain or tingling.  You may also take Tylenol 650 mg every 6 hours as needed.     Return to the ER for worsening pain or swelling, fevers, trouble walking, or other concerning signs.

## 2020-11-07 NOTE — ED PROVIDER NOTE - OBJECTIVE STATEMENT
49 y/o F w/ PMH of GERD presenting w/ hand and toe pain. Red room 4. Pt reports today developing 2nd toe on L foot pain and redness. Believes toe appears swollen compared to other toes as well. Woke up early this morning w/ R wrist pain and numbness. Has somewhat improved since onset. Has not had pain like this before. Also w/ L thumb pain. No known trauma. 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 Took excedrin at home w/ minimal improvement of symptoms. Denies fevers, chills, headache, dizziness, blurred vision, chest pain, cough, shortness of breath, abdominal pain, n/v/d/c, urinary symptoms, rash. 51 y/o F w/ PMH of GERD presenting w/ hand and toe pain. Red room 4. Pt reports today developing 2nd toe on L foot pain and redness. Believes toe appears swollen compared to other toes as well. Woke up early this morning w/ R wrist pain and numbness. Has somewhat improved since onset. Has not had pain like this before. Also w/ L thumb pain. No known trauma. 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 Took Excedrin at home w/ minimal improvement of symptoms. Denies fevers, chills, headache, dizziness, blurred vision, chest pain, cough, shortness of breath, abdominal pain, n/v/d/c, urinary symptoms, rash.  No falls or other injuries.

## 2020-11-07 NOTE — ED PROVIDER NOTE - ATTENDING CONTRIBUTION TO CARE
Attending Attestation: Dr. Jacob  I have personally performed a history and physical examination of the patient and discussed management with the resident as well as the patient.  I reviewed the resident's note and agree with the documented findings and plan of care.  I have authored and modified critical sections of the Provider Note, including but not limited to HPI, Physical Exam and MDM. 51 y/o F w/ PMH of GERD presenting w/ hand/wrist and toe pain. Pt overall well appearing on exam. R wrist symptoms c/w carpal tunnel given +tinnel's and presentation. Possible L as well given L thumb pain. Toe pain possible early cellulitis given mild erythema and tenderness. Will provide analgesia and abx. Will plan for DC w/ PCP f/u info, abx, and carpal tunnel management info. Will reassess the need for additional interventions as clinically warranted.

## 2020-11-07 NOTE — ED ADULT NURSE NOTE - OBJECTIVE STATEMENT
presents with multiple complaints. reports Right wrist soreness and left thumb pain since midnight Pt able full ROM with no pain. also presents with 2nd left toe pain since midnight redness noted to left toe. patient able to ambulate on foot. denies chest pain SOB nausea vomiting diarrhea fever chills. medicated as per MD orders. No Pmx. will continue to monitor.

## 2020-12-29 ENCOUNTER — EMERGENCY (EMERGENCY)
Facility: HOSPITAL | Age: 50
LOS: 1 days | Discharge: HOME | End: 2020-12-29
Attending: EMERGENCY MEDICINE | Admitting: EMERGENCY MEDICINE
Payer: COMMERCIAL

## 2020-12-29 VITALS
DIASTOLIC BLOOD PRESSURE: 108 MMHG | SYSTOLIC BLOOD PRESSURE: 163 MMHG | RESPIRATION RATE: 18 BRPM | HEIGHT: 63 IN | OXYGEN SATURATION: 99 % | HEART RATE: 89 BPM | TEMPERATURE: 98 F

## 2020-12-29 VITALS
RESPIRATION RATE: 18 BRPM | SYSTOLIC BLOOD PRESSURE: 183 MMHG | HEART RATE: 67 BPM | DIASTOLIC BLOOD PRESSURE: 98 MMHG | TEMPERATURE: 97 F | OXYGEN SATURATION: 100 %

## 2020-12-29 DIAGNOSIS — Z90.49 ACQUIRED ABSENCE OF OTHER SPECIFIED PARTS OF DIGESTIVE TRACT: Chronic | ICD-10-CM

## 2020-12-29 PROBLEM — K21.9 GASTRO-ESOPHAGEAL REFLUX DISEASE WITHOUT ESOPHAGITIS: Chronic | Status: ACTIVE | Noted: 2020-11-07

## 2020-12-29 LAB
ALBUMIN SERPL ELPH-MCNC: 4.4 G/DL — SIGNIFICANT CHANGE UP (ref 3.3–5)
ALP SERPL-CCNC: 67 U/L — SIGNIFICANT CHANGE UP (ref 40–120)
ALT FLD-CCNC: 13 U/L — SIGNIFICANT CHANGE UP (ref 4–33)
ANION GAP SERPL CALC-SCNC: 10 MMOL/L — SIGNIFICANT CHANGE UP (ref 7–14)
APPEARANCE UR: CLEAR — SIGNIFICANT CHANGE UP
AST SERPL-CCNC: 16 U/L — SIGNIFICANT CHANGE UP (ref 4–32)
BACTERIA # UR AUTO: NEGATIVE — SIGNIFICANT CHANGE UP
BASOPHILS # BLD AUTO: 0.02 K/UL — SIGNIFICANT CHANGE UP (ref 0–0.2)
BASOPHILS NFR BLD AUTO: 0.3 % — SIGNIFICANT CHANGE UP (ref 0–2)
BILIRUB SERPL-MCNC: <0.2 MG/DL — SIGNIFICANT CHANGE UP (ref 0.2–1.2)
BILIRUB UR-MCNC: NEGATIVE — SIGNIFICANT CHANGE UP
BUN SERPL-MCNC: 10 MG/DL — SIGNIFICANT CHANGE UP (ref 7–23)
CALCIUM SERPL-MCNC: 9.2 MG/DL — SIGNIFICANT CHANGE UP (ref 8.4–10.5)
CHLORIDE SERPL-SCNC: 104 MMOL/L — SIGNIFICANT CHANGE UP (ref 98–107)
CO2 SERPL-SCNC: 27 MMOL/L — SIGNIFICANT CHANGE UP (ref 22–31)
COLOR SPEC: SIGNIFICANT CHANGE UP
CREAT SERPL-MCNC: 0.79 MG/DL — SIGNIFICANT CHANGE UP (ref 0.5–1.3)
DIFF PNL FLD: ABNORMAL
EOSINOPHIL # BLD AUTO: 0.1 K/UL — SIGNIFICANT CHANGE UP (ref 0–0.5)
EOSINOPHIL NFR BLD AUTO: 1.4 % — SIGNIFICANT CHANGE UP (ref 0–6)
EPI CELLS # UR: 0 /HPF — SIGNIFICANT CHANGE UP (ref 0–5)
GLUCOSE SERPL-MCNC: 101 MG/DL — HIGH (ref 70–99)
GLUCOSE UR QL: NEGATIVE — SIGNIFICANT CHANGE UP
HCT VFR BLD CALC: 40 % — SIGNIFICANT CHANGE UP (ref 34.5–45)
HGB BLD-MCNC: 12.5 G/DL — SIGNIFICANT CHANGE UP (ref 11.5–15.5)
IANC: 4.6 K/UL — SIGNIFICANT CHANGE UP (ref 1.5–8.5)
IMM GRANULOCYTES NFR BLD AUTO: 0.1 % — SIGNIFICANT CHANGE UP (ref 0–1.5)
KETONES UR-MCNC: NEGATIVE — SIGNIFICANT CHANGE UP
LEUKOCYTE ESTERASE UR-ACNC: NEGATIVE — SIGNIFICANT CHANGE UP
LIDOCAIN IGE QN: 13 U/L — SIGNIFICANT CHANGE UP (ref 7–60)
LYMPHOCYTES # BLD AUTO: 1.85 K/UL — SIGNIFICANT CHANGE UP (ref 1–3.3)
LYMPHOCYTES # BLD AUTO: 26.1 % — SIGNIFICANT CHANGE UP (ref 13–44)
MCHC RBC-ENTMCNC: 31.3 GM/DL — LOW (ref 32–36)
MCHC RBC-ENTMCNC: 31.5 PG — SIGNIFICANT CHANGE UP (ref 27–34)
MCV RBC AUTO: 100.8 FL — HIGH (ref 80–100)
MONOCYTES # BLD AUTO: 0.5 K/UL — SIGNIFICANT CHANGE UP (ref 0–0.9)
MONOCYTES NFR BLD AUTO: 7.1 % — SIGNIFICANT CHANGE UP (ref 2–14)
NEUTROPHILS # BLD AUTO: 4.6 K/UL — SIGNIFICANT CHANGE UP (ref 1.8–7.4)
NEUTROPHILS NFR BLD AUTO: 65 % — SIGNIFICANT CHANGE UP (ref 43–77)
NITRITE UR-MCNC: NEGATIVE — SIGNIFICANT CHANGE UP
NRBC # BLD: 0 /100 WBCS — SIGNIFICANT CHANGE UP
NRBC # FLD: 0 K/UL — SIGNIFICANT CHANGE UP
PH UR: 6.5 — SIGNIFICANT CHANGE UP (ref 5–8)
PLATELET # BLD AUTO: 271 K/UL — SIGNIFICANT CHANGE UP (ref 150–400)
POTASSIUM SERPL-MCNC: 3.1 MMOL/L — LOW (ref 3.5–5.3)
POTASSIUM SERPL-SCNC: 3.1 MMOL/L — LOW (ref 3.5–5.3)
PROT SERPL-MCNC: 6.9 G/DL — SIGNIFICANT CHANGE UP (ref 6–8.3)
PROT UR-MCNC: ABNORMAL
RBC # BLD: 3.97 M/UL — SIGNIFICANT CHANGE UP (ref 3.8–5.2)
RBC # FLD: 13.4 % — SIGNIFICANT CHANGE UP (ref 10.3–14.5)
RBC CASTS # UR COMP ASSIST: 1 /HPF — SIGNIFICANT CHANGE UP (ref 0–4)
SARS-COV-2 RNA SPEC QL NAA+PROBE: SIGNIFICANT CHANGE UP
SODIUM SERPL-SCNC: 141 MMOL/L — SIGNIFICANT CHANGE UP (ref 135–145)
SP GR SPEC: 1.01 — SIGNIFICANT CHANGE UP (ref 1.01–1.02)
TROPONIN T, HIGH SENSITIVITY RESULT: <6 NG/L — SIGNIFICANT CHANGE UP
UROBILINOGEN FLD QL: SIGNIFICANT CHANGE UP
WBC # BLD: 7.08 K/UL — SIGNIFICANT CHANGE UP (ref 3.8–10.5)
WBC # FLD AUTO: 7.08 K/UL — SIGNIFICANT CHANGE UP (ref 3.8–10.5)
WBC UR QL: 1 /HPF — SIGNIFICANT CHANGE UP (ref 0–5)

## 2020-12-29 PROCEDURE — 93010 ELECTROCARDIOGRAM REPORT: CPT

## 2020-12-29 PROCEDURE — 99284 EMERGENCY DEPT VISIT MOD MDM: CPT | Mod: 25

## 2020-12-29 PROCEDURE — 74177 CT ABD & PELVIS W/CONTRAST: CPT | Mod: 26

## 2020-12-29 RX ORDER — ONDANSETRON 8 MG/1
4 TABLET, FILM COATED ORAL ONCE
Refills: 0 | Status: DISCONTINUED | OUTPATIENT
Start: 2020-12-29 | End: 2020-12-29

## 2020-12-29 RX ORDER — ONDANSETRON 8 MG/1
4 TABLET, FILM COATED ORAL ONCE
Refills: 0 | Status: COMPLETED | OUTPATIENT
Start: 2020-12-29 | End: 2020-12-29

## 2020-12-29 RX ORDER — ONDANSETRON 8 MG/1
1 TABLET, FILM COATED ORAL
Qty: 12 | Refills: 0
Start: 2020-12-29

## 2020-12-29 RX ORDER — SODIUM CHLORIDE 9 MG/ML
1000 INJECTION INTRAMUSCULAR; INTRAVENOUS; SUBCUTANEOUS ONCE
Refills: 0 | Status: COMPLETED | OUTPATIENT
Start: 2020-12-29 | End: 2020-12-29

## 2020-12-29 RX ORDER — POTASSIUM CHLORIDE 20 MEQ
40 PACKET (EA) ORAL ONCE
Refills: 0 | Status: COMPLETED | OUTPATIENT
Start: 2020-12-29 | End: 2020-12-29

## 2020-12-29 RX ORDER — FAMOTIDINE 10 MG/ML
20 INJECTION INTRAVENOUS ONCE
Refills: 0 | Status: COMPLETED | OUTPATIENT
Start: 2020-12-29 | End: 2020-12-29

## 2020-12-29 RX ORDER — KETOROLAC TROMETHAMINE 30 MG/ML
15 SYRINGE (ML) INJECTION ONCE
Refills: 0 | Status: DISCONTINUED | OUTPATIENT
Start: 2020-12-29 | End: 2020-12-29

## 2020-12-29 RX ADMIN — SODIUM CHLORIDE 1000 MILLILITER(S): 9 INJECTION INTRAMUSCULAR; INTRAVENOUS; SUBCUTANEOUS at 16:59

## 2020-12-29 RX ADMIN — Medication 15 MILLIGRAM(S): at 16:59

## 2020-12-29 RX ADMIN — Medication 40 MILLIEQUIVALENT(S): at 18:27

## 2020-12-29 RX ADMIN — FAMOTIDINE 20 MILLIGRAM(S): 10 INJECTION INTRAVENOUS at 16:59

## 2020-12-29 RX ADMIN — ONDANSETRON 4 MILLIGRAM(S): 8 TABLET, FILM COATED ORAL at 16:59

## 2020-12-29 RX ADMIN — Medication 30 MILLILITER(S): at 16:59

## 2020-12-29 NOTE — ED PROVIDER NOTE - NSFOLLOWUPINSTRUCTIONS_ED_ALL_ED_FT
- Continue all regular medications  - For nausea, take zofran as prescribed  - For pain, take tylenol and pepcid as directed on the packaging  - Follow up with your primary doctor next week, discuss need for further imaging of your adrenal nodule  - You were given copies of labs and/or imaging results if applicable, please take them to your follow up appointments  - Return to the ER for any worsening symptoms or concerns

## 2020-12-29 NOTE — ED PROVIDER NOTE - PATIENT PORTAL LINK FT
You can access the FollowMyHealth Patient Portal offered by [facility] by registering at the following website: [email or web address]. By joining [employer]’s FollowMyHealth portal, you will also be able to view your health information using other applications (apps) compatible with our system.

## 2020-12-29 NOTE — ED PROVIDER NOTE - CLINICAL SUMMARY MEDICAL DECISION MAKING FREE TEXT BOX
51yo female s/p cholecystectomy p/w N/V/D and epigastric pain. Mild epigastric TTP. Likely gastroenteritis but not passing gas. Will CT for SBO. Labs, pain control and reassess.

## 2020-12-29 NOTE — ED ADULT NURSE NOTE - OBJECTIVE STATEMENT
pt received at intake rm 1 AAO x 3. pt reports epigastric pain associated with n/v/d x 2 days. pt denies sob, chest pain, fevers, chills, cough, urinary symptoms, sick contacts. respirations even and unlabored. 20g iv placed to right ac.

## 2020-12-29 NOTE — ED PROVIDER NOTE - OBJECTIVE STATEMENT
51yo female s/p cholecystectomy p/w 2 days N/V/D (non-bloody) and epigastric burning pain. Feeling better today but every time she tries to eat she vomits. Feels like not passing gas despite having diarrhea. Febrile 101 yesterday. Denies cough, dyspnea, chest pain.

## 2020-12-29 NOTE — ED PROVIDER NOTE - PHYSICAL EXAMINATION
Physical Exam:  Gen: NAD, AOx3, non-toxic appearing, able to ambulate without assistance  Lung: CTAB, no respiratory distress, no wheezes/rhonchi/rales B/L, speaking in full sentences  CV: RRR, no murmurs, rubs or gallops, distal pulses 2+ b/l  Abd: mild epigastric TTP, soft, ND, no guarding, no rigidity, no rebound tenderness, no CVA tenderness   Skin: Warm, well perfused, no rash, no leg swelling  Psych: normal affect, calm

## 2020-12-29 NOTE — ED ADULT NURSE NOTE - NSIMPLEMENTINTERV_GEN_ALL_ED
Implemented All Universal Safety Interventions:  [place] to call system. Call bell, personal items and telephone within reach. Instruct patient to call for assistance. Room bathroom lighting operational. Non-slip footwear when patient is off stretcher. Physically safe environment: no spills, clutter or unnecessary equipment. Stretcher in lowest position, wheels locked, appropriate side rails in place.

## 2020-12-29 NOTE — ED PROVIDER NOTE - ATTENDING CONTRIBUTION TO CARE
Patient is a 49 yo F with history of anemia, hx of laparoscopic cholecystectomy here for nausea, vomiting and diarrhea x 2 days. Patient reports she has not been able to tolerate anything by mouth since yesterday. She is on iron, states her stool is always black. She reports a fever of 101 yesterday. Denies known COVID19 contacts. Denies chest pain or shortness of breath. She states she has burning pain in her upper abdomen. She also states she has not been able to pass gas. No travel. Denies chest pain with exertion, shortness of breath with exertion. No dysuria. Denies prior cardiac testing.     VS noted  Gen. no acute distress, Non toxic   HEENT: EOMI, dry mm  Lungs: CTAB/L no C/ W /R   CVS: RRR   Abd; Soft, epigastric tenderness, no rebound or guarding, no CVA tenderness bilaterally  Ext: no edema  Skin: no rash  Neuro AAOx3 non focal clear speech  a/p: vomiting/ diarrhea/ fever - concern for pancreatitis, COVID19, SBO. Plan for labs including lipase, u/a, CT A/P. Will check ekg and trop as well though low suspicion for ACS.   - Janay GUTIERREZ

## 2021-07-26 NOTE — PATIENT PROFILE ADULT. - TEACHING/LEARNING CULTURAL CONSIDERATIONS
none Isotretinoin Counseling: Patient should get monthly blood tests, not donate blood, not drive at night if vision affected, not share medication, and not undergo elective surgery for 6 months after tx completed. Side effects reviewed, pt to contact office should one occur.

## 2021-09-06 NOTE — H&P ADULT. - DIAGNOSTIC AND THERAPEUTIC PLAN DISCUSSED WITH
09/06/21 1530   Quick Adds   Type of Visit Initial PT Evaluation   Living Environment   People in home significant other   Current Living Arrangements house   Home Accessibility stairs to enter home;stairs within home   Number of Stairs, Main Entrance 2   Stair Railings, Main Entrance none   Number of Stairs, Within Home, Primary greater than 10 stairs  (12)   Stair Railings, Within Home, Primary railing on right side (ascending)   Transportation Anticipated family or friend will provide   Living Environment Comments Pt reports lives with supportive SO who has been assisting pt with dressing lately due to SOB, difficulty   Self-Care   Usual Activity Tolerance fair   Current Activity Tolerance poor   Equipment Currently Used at Home none   Disability/Function   Hearing Difficulty or Deaf no   Wear Glasses or Blind yes   Vision Management glasses   Concentrating, Remembering or Making Decisions Difficulty no   Difficulty Communicating no   Difficulty Eating/Swallowing no   Walking or Climbing Stairs Difficulty yes   Walking or Climbing Stairs ambulation difficulty, requires equipment;stair climbing difficulty, requires equipment;transferring difficulty, requires equipment   Mobility Management Oxygen, rests   Dressing/Bathing Difficulty yes   Dressing/Bathing dressing difficulty, assistance 1 person   Toileting issues no   Doing Errands Independently Difficulty (such as shopping) no   Fall history within last six months no   Change in Functional Status Since Onset of Current Illness/Injury yes   General Information   Onset of Illness/Injury or Date of Surgery 09/05/21   Referring Physician Vincent Rousseau MD   Patient/Family Therapy Goals Statement (PT) breathe easier   Pertinent History of Current Problem (include personal factors and/or comorbidities that impact the POC) 57 year old male with PMH ILD and rheumatoid lung disease, RA, SALTY, hypothyroid, HTN,anxiety and depression, HLD, duodenal anomaly admitted on  9/5/2021 in transfer for lung transplant workup and risk of need for ECMO.    Existing Precautions/Restrictions oxygen therapy device and L/min  (90% HFNC 35% FiO2)   General Observations Pt supine, agreeable to PT with goal this session to determine if safe to use commode at later time   Cognition   Orientation Status (Cognition) oriented x 4   Affect/Mental Status (Cognition)   (pleasant, mildly anxious surrounding difficulty breathing)   Follows Commands (Cognition) WFL   Posture    Posture Forward head position;Protracted shoulders   Strength   Strength Comments B LEs decreased due to decreased activity/tolerance with increased o2 needs   Bed Mobility   Comment (Bed Mobility) mod A sup<>sit   Transfers   Transfer Safety Comments Min A sit<>stand   Gait/Stairs (Locomotion)   Comment (Gait/Stairs) min A 3 steps wh walker along bed, O2 decrease to 85%   Clinical Impression   Criteria for Skilled Therapeutic Intervention yes, treatment indicated   PT Diagnosis (PT) impaired functional mobility   Influenced by the following impairments decreased strength, act tolerance, increased O2 needs   Functional limitations due to impairments decreased I with transfers, gait, bed mob, barrier navigation, ADLs   Clinical Presentation Unstable/Unpredictable   Clinical Presentation Rationale clinical judgement   Clinical Decision Making (Complexity) moderate complexity   Therapy Frequency (PT) 5x/week   Planned Therapy Interventions (PT) balance training;bed mobility training;gait training;home exercise program;patient/family education;stair training;strengthening;stretching;transfer training;progressive activity/exercise;risk factor education;home program guidelines   Risk & Benefits of therapy have been explained evaluation/treatment results reviewed;care plan/treatment goals reviewed;risks/benefits reviewed;current/potential barriers reviewed;participants voiced agreement with care plan;participants included;patient   PT  Discharge Planning    PT Discharge Recommendation (DC Rec) Transitional Care Facility   PT Rationale for DC Rec At this time pt would need TCU but also not medically stable and requires continued work up, will monitor progress closely.   PT Brief overview of current status  Heavy min A transfers with edu to slow down for energy conservation, longer exhales   Total Evaluation Time   Total Evaluation Time (Minutes) 8      Dr Valdez

## 2022-04-12 ENCOUNTER — EMERGENCY (EMERGENCY)
Facility: HOSPITAL | Age: 52
LOS: 1 days | Discharge: HOME | End: 2022-04-12
Attending: EMERGENCY MEDICINE | Admitting: EMERGENCY MEDICINE
Payer: COMMERCIAL

## 2022-04-12 VITALS
HEART RATE: 104 BPM | HEIGHT: 63 IN | TEMPERATURE: 97 F | SYSTOLIC BLOOD PRESSURE: 215 MMHG | OXYGEN SATURATION: 100 % | RESPIRATION RATE: 16 BRPM | DIASTOLIC BLOOD PRESSURE: 89 MMHG

## 2022-04-12 DIAGNOSIS — Z90.49 ACQUIRED ABSENCE OF OTHER SPECIFIED PARTS OF DIGESTIVE TRACT: Chronic | ICD-10-CM

## 2022-04-12 PROCEDURE — 99283 EMERGENCY DEPT VISIT LOW MDM: CPT

## 2022-04-12 RX ORDER — ACETAMINOPHEN 500 MG
650 TABLET ORAL ONCE
Refills: 0 | Status: COMPLETED | OUTPATIENT
Start: 2022-04-12 | End: 2022-04-12

## 2022-04-12 RX ADMIN — Medication 650 MILLIGRAM(S): at 23:32

## 2022-04-12 NOTE — ED PROVIDER NOTE - PATIENT PORTAL LINK FT
You can access the FollowMyHealth Patient Portal offered by [facility] by registering at the following website: [email or web address]. By joining [employer]’s FollowMyHealth portal, you will also be able to view your health information using other applications (apps) compatible with our system.

## 2022-04-12 NOTE — ED PROVIDER NOTE - OBJECTIVE STATEMENT
50 y/o female c/o htn.  States she went to [facility] for sore knees after bumping into something at work.  While at [facility], she was told she had high bp and to go to ED for eval.  States she has pcp and known htn.  She is rx medications but has not taken them as her mother was on bp meds and had an MI.  Feels at [facility].  Denies cp sob palpitations fatigue ha.

## 2022-04-12 NOTE — ED PROVIDER NOTE - NSPTACCESSSVCSAPPTDETAILS_ED_ALL_ED_FT
Needs primary care follow-up for hypertension. Patient would like to schedule appointment with new primary care physician. I told her that I would refer her to the resident practice at the [facility]. Telephone number for the clinic is [phone].

## 2022-04-12 NOTE — ED PROVIDER NOTE - CLINICAL SUMMARY MEDICAL DECISION MAKING FREE TEXT BOX
52 y/o female known htn non comp with meds.  Asymptomatic htn.  Had long conversation with pt about the importance of long term bp management as well as the danger of acutely lowering bp in the setting of no symptoms.  Pt agrees to have close o/p f/u to have bp meds optimized. I advised keeping a log of daily bp's for the pcp to better optimize bp meds.  Pt agrees to return immediately if any cp, sob, confusion, numbness/tingling/weakness to arms/legs, dec urine output, or for any other concerns.

## 2022-04-12 NOTE — ED PROVIDER NOTE - NSICDXFAMILYHX_GEN_ALL_CORE_FT
FAMILY HISTORY:  Family history of diabetes mellitus  Family history of hypertension  Family history of Parkinson's disease    Sibling  Still living? Unknown  Family history of sarcoidosis, Age at diagnosis: Age Unknown

## 2022-04-12 NOTE — ED ADULT TRIAGE NOTE - CHIEF COMPLAINT QUOTE
Pt presents to ED ambulatory from home with hypertension. Pt reports headache. Pt denies chest pain, vision changes, numbness, tingling, or other neuro deficits. Pt denies past medical hx.

## 2022-10-07 ENCOUNTER — INPATIENT (INPATIENT)
Facility: HOSPITAL | Age: 52
LOS: 5 days | Discharge: HOME | End: 2022-10-13
Attending: INTERNAL MEDICINE | Admitting: INTERNAL MEDICINE

## 2022-10-07 VITALS
SYSTOLIC BLOOD PRESSURE: 142 MMHG | RESPIRATION RATE: 16 BRPM | TEMPERATURE: 100 F | HEART RATE: 125 BPM | OXYGEN SATURATION: 96 % | DIASTOLIC BLOOD PRESSURE: 98 MMHG | HEIGHT: 63 IN | WEIGHT: 199.96 LBS

## 2022-10-07 DIAGNOSIS — Z90.49 ACQUIRED ABSENCE OF OTHER SPECIFIED PARTS OF DIGESTIVE TRACT: Chronic | ICD-10-CM

## 2022-10-07 PROCEDURE — 99285 EMERGENCY DEPT VISIT HI MDM: CPT

## 2022-10-07 PROCEDURE — 93010 ELECTROCARDIOGRAM REPORT: CPT

## 2022-10-07 NOTE — ED ADULT TRIAGE NOTE - CHIEF COMPLAINT QUOTE
BIBA,  pt c/o N/V/D on/off x 1 week, worse past 2 days, abd cramping.  last vomited around 10:30pm.   c/o headache.  denies fever/chills.   pt states she had salad day before it started.

## 2022-10-08 DIAGNOSIS — R50.9 FEVER, UNSPECIFIED: ICD-10-CM

## 2022-10-08 LAB
ALBUMIN SERPL ELPH-MCNC: 4 G/DL — SIGNIFICANT CHANGE UP (ref 3.3–5)
ALP SERPL-CCNC: 69 U/L — SIGNIFICANT CHANGE UP (ref 40–120)
ALT FLD-CCNC: 20 U/L — SIGNIFICANT CHANGE UP (ref 12–78)
ANION GAP SERPL CALC-SCNC: 7 MMOL/L — SIGNIFICANT CHANGE UP (ref 5–17)
ANION GAP SERPL CALC-SCNC: 9 MMOL/L — SIGNIFICANT CHANGE UP (ref 5–17)
APPEARANCE UR: CLEAR — SIGNIFICANT CHANGE UP
APTT BLD: 28.3 SEC — SIGNIFICANT CHANGE UP (ref 27.5–35.5)
AST SERPL-CCNC: 17 U/L — SIGNIFICANT CHANGE UP (ref 15–37)
BACTERIA # UR AUTO: ABNORMAL
BASOPHILS # BLD AUTO: 0.02 K/UL — SIGNIFICANT CHANGE UP (ref 0–0.2)
BASOPHILS NFR BLD AUTO: 0.3 % — SIGNIFICANT CHANGE UP (ref 0–2)
BILIRUB SERPL-MCNC: 0.5 MG/DL — SIGNIFICANT CHANGE UP (ref 0.2–1.2)
BILIRUB UR-MCNC: ABNORMAL
BUN SERPL-MCNC: 17 MG/DL — SIGNIFICANT CHANGE UP (ref 7–23)
BUN SERPL-MCNC: 22 MG/DL — SIGNIFICANT CHANGE UP (ref 7–23)
CALCIUM SERPL-MCNC: 8.4 MG/DL — LOW (ref 8.5–10.1)
CALCIUM SERPL-MCNC: 9.3 MG/DL — SIGNIFICANT CHANGE UP (ref 8.5–10.1)
CHLORIDE SERPL-SCNC: 107 MMOL/L — SIGNIFICANT CHANGE UP (ref 96–108)
CHLORIDE SERPL-SCNC: 113 MMOL/L — HIGH (ref 96–108)
CO2 SERPL-SCNC: 23 MMOL/L — SIGNIFICANT CHANGE UP (ref 22–31)
CO2 SERPL-SCNC: 27 MMOL/L — SIGNIFICANT CHANGE UP (ref 22–31)
COLOR SPEC: YELLOW — SIGNIFICANT CHANGE UP
CREAT SERPL-MCNC: 0.85 MG/DL — SIGNIFICANT CHANGE UP (ref 0.5–1.3)
CREAT SERPL-MCNC: 1.23 MG/DL — SIGNIFICANT CHANGE UP (ref 0.5–1.3)
DIFF PNL FLD: ABNORMAL
EGFR: 53 ML/MIN/1.73M2 — LOW
EGFR: 83 ML/MIN/1.73M2 — SIGNIFICANT CHANGE UP
EOSINOPHIL # BLD AUTO: 0.03 K/UL — SIGNIFICANT CHANGE UP (ref 0–0.5)
EOSINOPHIL NFR BLD AUTO: 0.4 % — SIGNIFICANT CHANGE UP (ref 0–6)
EPI CELLS # UR: ABNORMAL
FLUAV AG NPH QL: SIGNIFICANT CHANGE UP
FLUBV AG NPH QL: SIGNIFICANT CHANGE UP
GLUCOSE SERPL-MCNC: 133 MG/DL — HIGH (ref 70–99)
GLUCOSE SERPL-MCNC: 136 MG/DL — HIGH (ref 70–99)
GLUCOSE UR QL: NEGATIVE MG/DL — SIGNIFICANT CHANGE UP
HCG SERPL-ACNC: 1 MIU/ML — SIGNIFICANT CHANGE UP
HCT VFR BLD CALC: 48.1 % — HIGH (ref 34.5–45)
HGB BLD-MCNC: 15.7 G/DL — HIGH (ref 11.5–15.5)
IMM GRANULOCYTES NFR BLD AUTO: 0.3 % — SIGNIFICANT CHANGE UP (ref 0–0.9)
INR BLD: 1.09 RATIO — SIGNIFICANT CHANGE UP (ref 0.88–1.16)
KETONES UR-MCNC: ABNORMAL
LACTATE SERPL-SCNC: 1.7 MMOL/L — SIGNIFICANT CHANGE UP (ref 0.7–2)
LEUKOCYTE ESTERASE UR-ACNC: ABNORMAL
LYMPHOCYTES # BLD AUTO: 0.88 K/UL — LOW (ref 1–3.3)
LYMPHOCYTES # BLD AUTO: 11.9 % — LOW (ref 13–44)
MCHC RBC-ENTMCNC: 31.7 PG — SIGNIFICANT CHANGE UP (ref 27–34)
MCHC RBC-ENTMCNC: 32.6 G/DL — SIGNIFICANT CHANGE UP (ref 32–36)
MCV RBC AUTO: 97.2 FL — SIGNIFICANT CHANGE UP (ref 80–100)
MONOCYTES # BLD AUTO: 0.88 K/UL — SIGNIFICANT CHANGE UP (ref 0–0.9)
MONOCYTES NFR BLD AUTO: 11.9 % — SIGNIFICANT CHANGE UP (ref 2–14)
NEUTROPHILS # BLD AUTO: 5.58 K/UL — SIGNIFICANT CHANGE UP (ref 1.8–7.4)
NEUTROPHILS NFR BLD AUTO: 75.2 % — SIGNIFICANT CHANGE UP (ref 43–77)
NITRITE UR-MCNC: NEGATIVE — SIGNIFICANT CHANGE UP
NRBC # BLD: 0 /100 WBCS — SIGNIFICANT CHANGE UP (ref 0–0)
PH UR: 5 — SIGNIFICANT CHANGE UP (ref 5–8)
PLATELET # BLD AUTO: 203 K/UL — SIGNIFICANT CHANGE UP (ref 150–400)
POTASSIUM SERPL-MCNC: 2.9 MMOL/L — CRITICAL LOW (ref 3.5–5.3)
POTASSIUM SERPL-MCNC: 3.3 MMOL/L — LOW (ref 3.5–5.3)
POTASSIUM SERPL-SCNC: 2.9 MMOL/L — CRITICAL LOW (ref 3.5–5.3)
POTASSIUM SERPL-SCNC: 3.3 MMOL/L — LOW (ref 3.5–5.3)
PROT SERPL-MCNC: 7.6 GM/DL — SIGNIFICANT CHANGE UP (ref 6–8.3)
PROT UR-MCNC: 100 MG/DL
PROTHROM AB SERPL-ACNC: 13.1 SEC — SIGNIFICANT CHANGE UP (ref 10.5–13.4)
RBC # BLD: 4.95 M/UL — SIGNIFICANT CHANGE UP (ref 3.8–5.2)
RBC # FLD: 14.3 % — SIGNIFICANT CHANGE UP (ref 10.3–14.5)
RBC CASTS # UR COMP ASSIST: ABNORMAL /HPF (ref 0–4)
SARS-COV-2 RNA SPEC QL NAA+PROBE: SIGNIFICANT CHANGE UP
SODIUM SERPL-SCNC: 143 MMOL/L — SIGNIFICANT CHANGE UP (ref 135–145)
SODIUM SERPL-SCNC: 143 MMOL/L — SIGNIFICANT CHANGE UP (ref 135–145)
SP GR SPEC: 1.02 — SIGNIFICANT CHANGE UP (ref 1.01–1.02)
UROBILINOGEN FLD QL: NEGATIVE MG/DL — SIGNIFICANT CHANGE UP
WBC # BLD: 7.41 K/UL — SIGNIFICANT CHANGE UP (ref 3.8–10.5)
WBC # FLD AUTO: 7.41 K/UL — SIGNIFICANT CHANGE UP (ref 3.8–10.5)
WBC UR QL: ABNORMAL

## 2022-10-08 PROCEDURE — 74177 CT ABD & PELVIS W/CONTRAST: CPT | Mod: 26,MA

## 2022-10-08 PROCEDURE — 99222 1ST HOSP IP/OBS MODERATE 55: CPT

## 2022-10-08 PROCEDURE — 71045 X-RAY EXAM CHEST 1 VIEW: CPT | Mod: 26

## 2022-10-08 RX ORDER — SODIUM CHLORIDE 9 MG/ML
2800 INJECTION, SOLUTION INTRAVENOUS ONCE
Refills: 0 | Status: COMPLETED | OUTPATIENT
Start: 2022-10-08 | End: 2022-10-08

## 2022-10-08 RX ORDER — ACETAMINOPHEN 500 MG
975 TABLET ORAL ONCE
Refills: 0 | Status: COMPLETED | OUTPATIENT
Start: 2022-10-08 | End: 2022-10-08

## 2022-10-08 RX ORDER — MORPHINE SULFATE 50 MG/1
4 CAPSULE, EXTENDED RELEASE ORAL ONCE
Refills: 0 | Status: DISCONTINUED | OUTPATIENT
Start: 2022-10-08 | End: 2022-10-08

## 2022-10-08 RX ORDER — SODIUM CHLORIDE 9 MG/ML
1000 INJECTION, SOLUTION INTRAVENOUS
Refills: 0 | Status: DISCONTINUED | OUTPATIENT
Start: 2022-10-08 | End: 2022-10-08

## 2022-10-08 RX ORDER — SODIUM CHLORIDE 9 MG/ML
1000 INJECTION, SOLUTION INTRAVENOUS
Refills: 0 | Status: DISCONTINUED | OUTPATIENT
Start: 2022-10-08 | End: 2022-10-10

## 2022-10-08 RX ORDER — POTASSIUM CHLORIDE 20 MEQ
40 PACKET (EA) ORAL ONCE
Refills: 0 | Status: COMPLETED | OUTPATIENT
Start: 2022-10-08 | End: 2022-10-08

## 2022-10-08 RX ORDER — ONDANSETRON 8 MG/1
4 TABLET, FILM COATED ORAL ONCE
Refills: 0 | Status: COMPLETED | OUTPATIENT
Start: 2022-10-08 | End: 2022-10-08

## 2022-10-08 RX ORDER — ACETAMINOPHEN 500 MG
650 TABLET ORAL EVERY 6 HOURS
Refills: 0 | Status: DISCONTINUED | OUTPATIENT
Start: 2022-10-08 | End: 2022-10-13

## 2022-10-08 RX ORDER — METOPROLOL TARTRATE 50 MG
50 TABLET ORAL DAILY
Refills: 0 | Status: DISCONTINUED | OUTPATIENT
Start: 2022-10-08 | End: 2022-10-13

## 2022-10-08 RX ORDER — PANTOPRAZOLE SODIUM 20 MG/1
40 TABLET, DELAYED RELEASE ORAL
Refills: 0 | Status: DISCONTINUED | OUTPATIENT
Start: 2022-10-08 | End: 2022-10-13

## 2022-10-08 RX ORDER — HEPARIN SODIUM 5000 [USP'U]/ML
5000 INJECTION INTRAVENOUS; SUBCUTANEOUS EVERY 12 HOURS
Refills: 0 | Status: DISCONTINUED | OUTPATIENT
Start: 2022-10-08 | End: 2022-10-13

## 2022-10-08 RX ORDER — CIPROFLOXACIN LACTATE 400MG/40ML
400 VIAL (ML) INTRAVENOUS EVERY 12 HOURS
Refills: 0 | Status: DISCONTINUED | OUTPATIENT
Start: 2022-10-08 | End: 2022-10-09

## 2022-10-08 RX ORDER — CIPROFLOXACIN LACTATE 400MG/40ML
400 VIAL (ML) INTRAVENOUS ONCE
Refills: 0 | Status: COMPLETED | OUTPATIENT
Start: 2022-10-08 | End: 2022-10-08

## 2022-10-08 RX ORDER — POTASSIUM CHLORIDE 20 MEQ
10 PACKET (EA) ORAL
Refills: 0 | Status: COMPLETED | OUTPATIENT
Start: 2022-10-08 | End: 2022-10-08

## 2022-10-08 RX ORDER — METRONIDAZOLE 500 MG
500 TABLET ORAL ONCE
Refills: 0 | Status: COMPLETED | OUTPATIENT
Start: 2022-10-08 | End: 2022-10-08

## 2022-10-08 RX ADMIN — SODIUM CHLORIDE 80 MILLILITER(S): 9 INJECTION, SOLUTION INTRAVENOUS at 14:08

## 2022-10-08 RX ADMIN — SODIUM CHLORIDE 2800 MILLILITER(S): 9 INJECTION, SOLUTION INTRAVENOUS at 00:45

## 2022-10-08 RX ADMIN — ONDANSETRON 4 MILLIGRAM(S): 8 TABLET, FILM COATED ORAL at 04:40

## 2022-10-08 RX ADMIN — SODIUM CHLORIDE 100 MILLILITER(S): 9 INJECTION, SOLUTION INTRAVENOUS at 12:01

## 2022-10-08 RX ADMIN — Medication 100 MILLIEQUIVALENT(S): at 04:35

## 2022-10-08 RX ADMIN — SODIUM CHLORIDE 2800 MILLILITER(S): 9 INJECTION, SOLUTION INTRAVENOUS at 03:12

## 2022-10-08 RX ADMIN — Medication 50 MILLIGRAM(S): at 14:56

## 2022-10-08 RX ADMIN — Medication 975 MILLIGRAM(S): at 01:26

## 2022-10-08 RX ADMIN — Medication 975 MILLIGRAM(S): at 03:12

## 2022-10-08 RX ADMIN — Medication 200 MILLIGRAM(S): at 06:08

## 2022-10-08 RX ADMIN — Medication 650 MILLIGRAM(S): at 22:40

## 2022-10-08 RX ADMIN — Medication 100 MILLIEQUIVALENT(S): at 07:59

## 2022-10-08 RX ADMIN — Medication 650 MILLIGRAM(S): at 21:40

## 2022-10-08 RX ADMIN — HEPARIN SODIUM 5000 UNIT(S): 5000 INJECTION INTRAVENOUS; SUBCUTANEOUS at 17:32

## 2022-10-08 RX ADMIN — Medication 100 MILLIEQUIVALENT(S): at 06:13

## 2022-10-08 RX ADMIN — Medication 100 MILLIGRAM(S): at 04:45

## 2022-10-08 RX ADMIN — Medication 40 MILLIEQUIVALENT(S): at 16:29

## 2022-10-08 RX ADMIN — MORPHINE SULFATE 4 MILLIGRAM(S): 50 CAPSULE, EXTENDED RELEASE ORAL at 04:43

## 2022-10-08 RX ADMIN — Medication 200 MILLIGRAM(S): at 17:32

## 2022-10-08 RX ADMIN — Medication 40 MILLIEQUIVALENT(S): at 04:33

## 2022-10-08 NOTE — ED ADULT NURSE NOTE - OBJECTIVE STATEMENT
Pt received in bed 42D, 53 y/o F, A&Ox3, c/o lower abdominal pain, N, V, D X 1 week. Pt is febrile in the .6 F rectally, tachycardic 110s, hypertensive, maintaining on room air. Pt states she had 8 episodes of diarrhea at home yesterday and 6 episodes of vomiting. Pt has no episodes here in the ED. Pt received in bed 42D, 51 y/o F, A&Ox3, c/o lower abdominal pain, N, V, D X 1 week. Pt is febrile in the .6 F rectally, tachycardic 110s, hypertensive, maintaining on room air. Pt states she had 8 episodes of diarrhea at home yesterday and 6 episodes of vomiting. Pt has no episodes of vomiting or diarrhea here in the ED. Sepsis protocol initiated. Pt placed on cardiac monitor, maintaining on room air.

## 2022-10-08 NOTE — ED PROVIDER NOTE - OBJECTIVE STATEMENT
lower abd pain, nausea, vomiting  had salad the day before  no fevers or chills no diarrhea 52 year old female presents today c/o one week h/o intermittent nausea, vomiting and diarrhea lower abd pain, nausea, vomiting and diarrhea, pt reports having a salad the day before, denies recent travel or sick contacts

## 2022-10-08 NOTE — H&P ADULT - NSHPREVIEWOFSYSTEMS_GEN_ALL_CORE
REVIEW OF SYSTEMS:  GEN: no fever,    no chills  RESP: no SOB,   no cough  CVS: no chest pain,   no palpitations  GI: no abdominal pain,   no nausea,   no vomiting,   no constipation,  / diarrhea  : no dysuria,   no frequency  NEURO: no headache,   no dizziness  PSYCH: no depression,   not anxious  Derm : no rash

## 2022-10-08 NOTE — ED ADULT NURSE NOTE - ED STAT RN HANDOFF DETAILS
Report given to receiving Ed hold RN Dayna, pts history, current condition and reason for admission discussed, safety concerns addressed and reviewed, pt currently in stable condition, IV flushes for patency and site shows no signs or symptoms of infiltrate, dressing is clean dry and intact, pt is aware of plan of care. Pt education deemed successful at time of report after patient demonstrates successful teach back for proficiency. Pt A&Ox3, resting comfortably in bed, appears in NAD, on cardiac monitor, IV potassium and cipro infusing. Pt pending bed assignment on admission unit.

## 2022-10-08 NOTE — PATIENT PROFILE ADULT - FALL HARM RISK - UNIVERSAL INTERVENTIONS
Bed in lowest position, wheels locked, appropriate side rails in place/Call bell, personal items and telephone in reach/Instruct patient to call for assistance before getting out of bed or chair/Non-slip footwear when patient is out of bed/[place] to call system/Physically safe environment - no spills, clutter or unnecessary equipment/Purposeful Proactive Rounding/Room/bathroom lighting operational, light cord in reach

## 2022-10-08 NOTE — H&P ADULT - ASSESSMENT
52 year old female   pt  states  she has no  pmh/ takes  no meds   however,  was  told   to take bp meds, that  she ha s refused      presents today , c/o one week h/o intermittent nausea, vomiting and diarrhea lower abd pain,.    pt reports   eating  from outside, including  sea food, chinese  food  and salads    denies recent travel or sick contacts/    seen in er, has  no  abd  pain  now     *  admitted with almost 1  week  of loose non bloody  diarrhea  has been afebrile    admits to  eating  food  from outside   suspect  gastroenteritis   CT A/P, likely ileus, m\non specific  enetritis    on   liquid  diet. iv fluids/  stool   c/s/ gi pcr  gi eval in  am    * Hypokalemia   follow rpt bmp   *  HTN  pt is non  complaint  with her meds/ thinks  shes  supposed  to  take norvasc   *  on  dvt ppx/ s/q  heparin       < from: CT Abdomen and Pelvis w/ IV Cont (10.08.22 @ 02:12) >  MPRESSION:  Mildly prominent fluid-filled small bowel loops without discrete   transition point identified, likely ileus.  Diffuse long segmental wall thickening/hyperemia of distal jejunoileal   folds with adjacent perienteric stranding and mesenteric edema. Findings   are nonspecific, likely represent nonspecific enteritis, favor infectious   or inflammatory. Inflammatory bowel disease considered in the   differential. Correlate with lactic acid.  Mild ascites.  Additional findings as mentioned above.  --- End of Report ---  MIGUEL SÁNCHEZ MD; Attending Radiologist  This document has been electroni    < end of coped text >

## 2022-10-08 NOTE — H&P ADULT - NSHPPHYSICALEXAM_GEN_ALL_CORE
PHYSICAL EXAMINATION:  Vital Signs Last 24 Hrs  T(C): 37.1 (08 Oct 2022 11:33), Max: 39.2 (08 Oct 2022 00:43)  T(F): 98.8 (08 Oct 2022 11:33), Max: 102.6 (08 Oct 2022 00:43)  HR: 87 (08 Oct 2022 11:33) (86 - 125)  BP: 192/103 (08 Oct 2022 11:33) (142/98 - 192/103)  BP(mean): --  RR: 18 (08 Oct 2022 11:33) (15 - 18)  SpO2: 98% (08 Oct 2022 11:33) (96% - 98%)    Parameters below as of 08 Oct 2022 11:33  Patient On (Oxygen Delivery Method): room air      CAPILLARY BLOOD GLUCOSE            GENERAL: NAD, well-groomed,  HEAD:  atraumatic, normocephalic  EYES: sclera anicteric  ENMT: mucous membranes moist  NECK: supple, No JVD  CHEST/LUNG: clear to auscultation bilaterally;    no      rales   ,   no rhonchi,   HEART: normal S1, S2  ABDOMEN: BS+, soft, ND, NT   EXTREMITIES:    no    edema    b/l LEs  NEURO: awake, ,     moves all extremities  SKIN: no     rash

## 2022-10-08 NOTE — H&P ADULT - HISTORY OF PRESENT ILLNESS
52 year old female   pt  states  she has no  pmh/ takes  no meds   however,  was  told   to take bp meds, that  she ha s refused      presents today , c/o one week h/o intermittent nausea, vomiting and diarrhea lower abd pain,.    pt reports   eating  from outside, including  sea food, chinese  food  and salads    denies recent travel or sick contacts  pt  seen in er, ha sno abd  pain  now

## 2022-10-08 NOTE — H&P ADULT - NSHPLABSRESULTS_GEN_ALL_CORE
LABS:                        15.7   7.41  )-----------( 203      ( 08 Oct 2022 00:30 )             48.1     10    143  |  107  |  22  ----------------------------<  133<H>  2.9<LL>   |  27  |  1.23    Ca    9.3      08 Oct 2022 00:30    TPro  7.6  /  Alb  4.0  /  TBili  0.5  /  DBili  x   /  AST  17  /  ALT  20  /  AlkPhos  69  10    PT/INR - ( 08 Oct 2022 00:30 )   PT: 13.1 sec;   INR: 1.09 ratio         PTT - ( 08 Oct 2022 00:30 )  PTT:28.3 sec      Urinalysis Basic - ( 08 Oct 2022 00:30 )    Color: Yellow / Appearance: Clear / S.025 / pH: x  Gluc: x / Ketone: Small  / Bili: Small / Urobili: Negative mg/dL   Blood: x / Protein: 100 mg/dL / Nitrite: Negative   Leuk Esterase: Trace / RBC: 11-25 /HPF / WBC 6-10   Sq Epi: x / Non Sq Epi: Moderate / Bacteria: Few      Lactate, Blood: 1.7 mmol/L (10-08 @ 00:30)

## 2022-10-08 NOTE — ED PROVIDER NOTE - EYES, MLM
Form faxed to [phone]  Patient notified     Clear bilaterally, pupils equal, round and reactive to light.

## 2022-10-09 LAB
ANION GAP SERPL CALC-SCNC: 6 MMOL/L — SIGNIFICANT CHANGE UP (ref 5–17)
BUN SERPL-MCNC: 11 MG/DL — SIGNIFICANT CHANGE UP (ref 7–23)
CALCIUM SERPL-MCNC: 8.6 MG/DL — SIGNIFICANT CHANGE UP (ref 8.5–10.1)
CHLORIDE SERPL-SCNC: 112 MMOL/L — HIGH (ref 96–108)
CO2 SERPL-SCNC: 27 MMOL/L — SIGNIFICANT CHANGE UP (ref 22–31)
CREAT SERPL-MCNC: 0.74 MG/DL — SIGNIFICANT CHANGE UP (ref 0.5–1.3)
EGFR: 98 ML/MIN/1.73M2 — SIGNIFICANT CHANGE UP
EPEC DNA STL QL NAA+PROBE: DETECTED
GI PCR PANEL: DETECTED
GLUCOSE SERPL-MCNC: 72 MG/DL — SIGNIFICANT CHANGE UP (ref 70–99)
HCT VFR BLD CALC: 41 % — SIGNIFICANT CHANGE UP (ref 34.5–45)
HGB BLD-MCNC: 13.4 G/DL — SIGNIFICANT CHANGE UP (ref 11.5–15.5)
MCHC RBC-ENTMCNC: 31.6 PG — SIGNIFICANT CHANGE UP (ref 27–34)
MCHC RBC-ENTMCNC: 32.7 G/DL — SIGNIFICANT CHANGE UP (ref 32–36)
MCV RBC AUTO: 96.7 FL — SIGNIFICANT CHANGE UP (ref 80–100)
NRBC # BLD: 0 /100 WBCS — SIGNIFICANT CHANGE UP (ref 0–0)
PLATELET # BLD AUTO: 168 K/UL — SIGNIFICANT CHANGE UP (ref 150–400)
POTASSIUM SERPL-MCNC: 3.8 MMOL/L — SIGNIFICANT CHANGE UP (ref 3.5–5.3)
POTASSIUM SERPL-SCNC: 3.8 MMOL/L — SIGNIFICANT CHANGE UP (ref 3.5–5.3)
RBC # BLD: 4.24 M/UL — SIGNIFICANT CHANGE UP (ref 3.8–5.2)
RBC # FLD: 14 % — SIGNIFICANT CHANGE UP (ref 10.3–14.5)
SODIUM SERPL-SCNC: 145 MMOL/L — SIGNIFICANT CHANGE UP (ref 135–145)
WBC # BLD: 5.98 K/UL — SIGNIFICANT CHANGE UP (ref 3.8–10.5)
WBC # FLD AUTO: 5.98 K/UL — SIGNIFICANT CHANGE UP (ref 3.8–10.5)

## 2022-10-09 PROCEDURE — 99232 SBSQ HOSP IP/OBS MODERATE 35: CPT

## 2022-10-09 RX ORDER — LACTOBACILLUS ACIDOPHILUS 100MM CELL
1 CAPSULE ORAL
Refills: 0 | Status: DISCONTINUED | OUTPATIENT
Start: 2022-10-09 | End: 2022-10-13

## 2022-10-09 RX ORDER — HYDRALAZINE HCL 50 MG
25 TABLET ORAL ONCE
Refills: 0 | Status: COMPLETED | OUTPATIENT
Start: 2022-10-09 | End: 2022-10-09

## 2022-10-09 RX ORDER — AMLODIPINE BESYLATE 2.5 MG/1
5 TABLET ORAL DAILY
Refills: 0 | Status: DISCONTINUED | OUTPATIENT
Start: 2022-10-09 | End: 2022-10-10

## 2022-10-09 RX ORDER — HYDRALAZINE HCL 50 MG
25 TABLET ORAL EVERY 8 HOURS
Refills: 0 | Status: DISCONTINUED | OUTPATIENT
Start: 2022-10-09 | End: 2022-10-10

## 2022-10-09 RX ADMIN — Medication 50 MILLIGRAM(S): at 05:51

## 2022-10-09 RX ADMIN — SODIUM CHLORIDE 80 MILLILITER(S): 9 INJECTION, SOLUTION INTRAVENOUS at 05:50

## 2022-10-09 RX ADMIN — Medication 200 MILLIGRAM(S): at 05:52

## 2022-10-09 RX ADMIN — HEPARIN SODIUM 5000 UNIT(S): 5000 INJECTION INTRAVENOUS; SUBCUTANEOUS at 17:22

## 2022-10-09 RX ADMIN — AMLODIPINE BESYLATE 5 MILLIGRAM(S): 2.5 TABLET ORAL at 12:27

## 2022-10-09 RX ADMIN — Medication 1 TABLET(S): at 17:30

## 2022-10-09 RX ADMIN — Medication 25 MILLIGRAM(S): at 22:03

## 2022-10-09 RX ADMIN — PANTOPRAZOLE SODIUM 40 MILLIGRAM(S): 20 TABLET, DELAYED RELEASE ORAL at 08:00

## 2022-10-09 RX ADMIN — Medication 650 MILLIGRAM(S): at 20:40

## 2022-10-09 RX ADMIN — SODIUM CHLORIDE 80 MILLILITER(S): 9 INJECTION, SOLUTION INTRAVENOUS at 17:30

## 2022-10-09 RX ADMIN — Medication 650 MILLIGRAM(S): at 08:08

## 2022-10-09 RX ADMIN — HEPARIN SODIUM 5000 UNIT(S): 5000 INJECTION INTRAVENOUS; SUBCUTANEOUS at 05:51

## 2022-10-09 RX ADMIN — Medication 650 MILLIGRAM(S): at 09:05

## 2022-10-09 RX ADMIN — Medication 25 MILLIGRAM(S): at 16:25

## 2022-10-09 RX ADMIN — Medication 650 MILLIGRAM(S): at 21:40

## 2022-10-09 NOTE — PROGRESS NOTE ADULT - SUBJECTIVE AND OBJECTIVE BOX
Patient is a 52y old  Female who presents with a chief complaint of diarrhea (08 Oct 2022 12:30)      INTERVAL HPI/OVERNIGHT EVENTS:    MEDICATIONS  (STANDING):  amLODIPine   Tablet 5 milliGRAM(s) Oral daily  heparin   Injectable 5000 Unit(s) SubCutaneous every 12 hours  lactated ringers. 1000 milliLiter(s) (80 mL/Hr) IV Continuous <Continuous>  lactobacillus acidophilus 1 Tablet(s) Oral two times a day  metoprolol succinate ER 50 milliGRAM(s) Oral daily  pantoprazole    Tablet 40 milliGRAM(s) Oral before breakfast    MEDICATIONS  (PRN):  acetaminophen     Tablet .. 650 milliGRAM(s) Oral every 6 hours PRN Temp greater or equal to 38C (100.4F), Mild Pain (1 - 3)      Allergies    No Known Allergies    Intolerances        REVIEW OF SYSTEMS:  CONSTITUTIONAL: No fever, weight loss, or fatigue  EYES: No eye pain, visual disturbances, or discharge  ENMT:  No difficulty hearing, tinnitus, vertigo; No sinus or throat pain  NECK: No pain or stiffness  BREASTS: No pain, masses, or nipple discharge  RESPIRATORY: No cough, wheezing, chills or hemoptysis; No shortness of breath  CARDIOVASCULAR: No chest pain, palpitations, dizziness, or leg swelling  GASTROINTESTINAL: No abdominal or epigastric pain. No nausea, vomiting, or hematemesis; No diarrhea or constipation. No melena or hematochezia.  GENITOURINARY: No dysuria, frequency, hematuria, or incontinence  NEUROLOGICAL: No headaches, memory loss, loss of strength, numbness, or tremors  SKIN: No itching, burning, rashes, or lesions   LYMPH NODES: No enlarged glands  ENDOCRINE: No heat or cold intolerance; No hair loss  MUSCULOSKELETAL: No joint pain or swelling; No muscle, back, or extremity pain  PSYCHIATRIC: No depression, anxiety, mood swings, or difficulty sleeping  HEME/LYMPH: No easy bruising, or bleeding gums  ALLERGY AND IMMUNOLOGIC: No hives or eczema    Vital Signs Last 24 Hrs  T(C): 36.9 (09 Oct 2022 05:14), Max: 37.3 (08 Oct 2022 23:30)  T(F): 98.4 (09 Oct 2022 05:14), Max: 99.1 (08 Oct 2022 23:30)  HR: 70 (09 Oct 2022 05:14) (68 - 88)  BP: 161/83 (09 Oct 2022 05:14) (149/85 - 173/96)  BP(mean): --  RR: 18 (09 Oct 2022 05:14) (18 - 18)  SpO2: 96% (09 Oct 2022 05:14) (95% - 97%)    Parameters below as of 09 Oct 2022 05:14  Patient On (Oxygen Delivery Method): room air        PHYSICAL EXAM:  GENERAL: NAD, well-groomed, well-developed  HEAD:  Atraumatic, Normocephalic  EYES: EOMI, PERRLA, conjunctiva and sclera clear  ENMT: No tonsillar erythema, exudates, or enlargement; Moist mucous membranes, Good dentition, No lesions  NECK: Supple, No JVD, Normal thyroid  NERVOUS SYSTEM:  Alert & Oriented X3, Good concentration; Motor Strength 5/5 B/L upper and lower extremities; DTRs 2+ intact and symmetric  CHEST/LUNG: Clear to percussion bilaterally; No rales, rhonchi, wheezing, or rubs  HEART: Regular rate and rhythm; No murmurs, rubs, or gallops  ABDOMEN: Soft, Nontender, Nondistended; Bowel sounds present  EXTREMITIES:  2+ Peripheral Pulses, No clubbing, cyanosis, or edema  LYMPH: No lymphadenopathy noted  SKIN: No rashes or lesions    LABS:                        13.4   5.98  )-----------( 168      ( 09 Oct 2022 07:26 )             41.0     10-09    145  |  112<H>  |  11  ----------------------------<  72  3.8   |  27  |  0.74    Ca    8.6      09 Oct 2022 07:00    TPro  7.6  /  Alb  4.0  /  TBili  0.5  /  DBili  x   /  AST  17  /  ALT  20  /  AlkPhos  69  10-08    PT/INR - ( 08 Oct 2022 00:30 )   PT: 13.1 sec;   INR: 1.09 ratio         PTT - ( 08 Oct 2022 00:30 )  PTT:28.3 sec  Urinalysis Basic - ( 08 Oct 2022 00:30 )    Color: Yellow / Appearance: Clear / S.025 / pH: x  Gluc: x / Ketone: Small  / Bili: Small / Urobili: Negative mg/dL   Blood: x / Protein: 100 mg/dL / Nitrite: Negative   Leuk Esterase: Trace / RBC: 11-25 /HPF / WBC 6-10   Sq Epi: x / Non Sq Epi: Moderate / Bacteria: Few      CAPILLARY BLOOD GLUCOSE          RADIOLOGY & ADDITIONAL TESTS:    Imaging Personally Reviewed:  [ ] YES  [ ] NO    Consultant(s) Notes Reviewed:  [ ] YES  [ ] NO    Care Discussed with Consultants/Other Providers [ ] YES  [ ] NO Patient is a 52y old  Female who presents with a chief complaint of diarrhea (08 Oct 2022 12:30)      INTERVAL HPI/OVERNIGHT EVENTS: Pt feeling much better already, states that is able to tolerate regular diet for the first time. Had one bm today   Labs stable  Vitals- stable   As per nursing- pt had multiple episodes of diarrhea yesterday, but today only one time     MEDICATIONS  (STANDING):  amLODIPine   Tablet 5 milliGRAM(s) Oral daily  heparin   Injectable 5000 Unit(s) SubCutaneous every 12 hours  lactated ringers. 1000 milliLiter(s) (80 mL/Hr) IV Continuous <Continuous>  lactobacillus acidophilus 1 Tablet(s) Oral two times a day  metoprolol succinate ER 50 milliGRAM(s) Oral daily  pantoprazole    Tablet 40 milliGRAM(s) Oral before breakfast    MEDICATIONS  (PRN):  acetaminophen     Tablet .. 650 milliGRAM(s) Oral every 6 hours PRN Temp greater or equal to 38C (100.4F), Mild Pain (1 - 3)      Allergies    No Known Allergies    Intolerances        REVIEW OF SYSTEMS:  CONSTITUTIONAL: No fever, weight loss, or fatigue  EYES: No eye pain, visual disturbances, or discharge  ENMT:  No difficulty hearing, tinnitus, vertigo; No sinus or throat pain  NECK: No pain or stiffness  BREASTS: No pain, masses, or nipple discharge  RESPIRATORY: No cough, wheezing, chills or hemoptysis; No shortness of breath  CARDIOVASCULAR: No chest pain, palpitations, dizziness, or leg swelling  GASTROINTESTINAL: No abdominal or epigastric pain. No nausea, vomiting, or hematemesis; No diarrhea or constipation. No melena or hematochezia.  GENITOURINARY: No dysuria, frequency, hematuria, or incontinence  NEUROLOGICAL: No headaches, memory loss, loss of strength, numbness, or tremors  SKIN: No itching, burning, rashes, or lesions   LYMPH NODES: No enlarged glands  ENDOCRINE: No heat or cold intolerance; No hair loss  MUSCULOSKELETAL: No joint pain or swelling; No muscle, back, or extremity pain  PSYCHIATRIC: No depression, anxiety, mood swings, or difficulty sleeping  HEME/LYMPH: No easy bruising, or bleeding gums  ALLERGY AND IMMUNOLOGIC: No hives or eczema    Vital Signs Last 24 Hrs  T(C): 36.9 (09 Oct 2022 05:14), Max: 37.3 (08 Oct 2022 23:30)  T(F): 98.4 (09 Oct 2022 05:14), Max: 99.1 (08 Oct 2022 23:30)  HR: 70 (09 Oct 2022 05:14) (68 - 88)  BP: 161/83 (09 Oct 2022 05:14) (149/85 - 173/96)  BP(mean): --  RR: 18 (09 Oct 2022 05:14) (18 - 18)  SpO2: 96% (09 Oct 2022 05:14) (95% - 97%)    Parameters below as of 09 Oct 2022 05:14  Patient On (Oxygen Delivery Method): room air        PHYSICAL EXAM:  GENERAL: NAD, well-groomed, well-developed  HEAD:  Atraumatic, Normocephalic  EYES: EOMI, PERRLA, conjunctiva and sclera clear  ENMT: No tonsillar erythema, exudates, or enlargement; Moist mucous membranes, Good dentition, No lesions  NECK: Supple, No JVD, Normal thyroid  NERVOUS SYSTEM:  Alert & Oriented X3, Good concentration; Motor Strength 5/5 B/L upper and lower extremities; DTRs 2+ intact and symmetric  CHEST/LUNG: Clear to percussion bilaterally; No rales, rhonchi, wheezing, or rubs  HEART: Regular rate and rhythm; No murmurs, rubs, or gallops  ABDOMEN: Soft, Nontender, Nondistended; Bowel sounds present  EXTREMITIES:  2+ Peripheral Pulses, No clubbing, cyanosis, or edema  LYMPH: No lymphadenopathy noted  SKIN: No rashes or lesions    LABS:                        13.4   5.98  )-----------( 168      ( 09 Oct 2022 07:26 )             41.0     10    145  |  112<H>  |  11  ----------------------------<  72  3.8   |  27  |  0.74    Ca    8.6      09 Oct 2022 07:00    TPro  7.6  /  Alb  4.0  /  TBili  0.5  /  DBili  x   /  AST  17  /  ALT  20  /  AlkPhos  69  10-08    PT/INR - ( 08 Oct 2022 00:30 )   PT: 13.1 sec;   INR: 1.09 ratio         PTT - ( 08 Oct 2022 00:30 )  PTT:28.3 sec  Urinalysis Basic - ( 08 Oct 2022 00:30 )    Color: Yellow / Appearance: Clear / S.025 / pH: x  Gluc: x / Ketone: Small  / Bili: Small / Urobili: Negative mg/dL   Blood: x / Protein: 100 mg/dL / Nitrite: Negative   Leuk Esterase: Trace / RBC: 11-25 /HPF / WBC 6-10   Sq Epi: x / Non Sq Epi: Moderate / Bacteria: Few      CAPILLARY BLOOD GLUCOSE          RADIOLOGY & ADDITIONAL TESTS:    Imaging Personally Reviewed:  [ ] YES  [ ] NO    Consultant(s) Notes Reviewed:  [ ] YES  [ ] NO    Care Discussed with Consultants/Other Providers [ ] YES  [ ] NO

## 2022-10-09 NOTE — PROGRESS NOTE ADULT - ASSESSMENT
52 year old female pt  states  she has no  pmh/ takes  no meds however,  was  told   to take bp meds, that  she ha s refused, presents today , c/o one week h/o intermittent nausea, vomiting and diarrhea lower abd pain,.  Pt reports   eating  from outside, including  sea food, chinese  food  and salads, denies recent travel or sick contacts/    seen in er, has  no  abd  pain  now    Loose non bloody diarrhea x1 week   Possible gastroenteritis  -continues to be afebrile  -no recent antibiotics   -lactic acid- wnl  -CT of abd-reviewed showing ?ileus or enteritis   -GI PCR pending, stool cultures pending  -antibiotics discontinued, s/p cipro and flagyl  -pt asking for regular diet- will see if tolerates       Hypokalemia  -resolved      HTN-elevated  -restarted on norvasc, continue metoprolol       *  on  dvt ppx/ s/q  heparin   52 year old female pt  states  she has no  pmh/ takes  no meds however,  was  told   to take bp meds, that  she ha s refused, presents today , c/o one week h/o intermittent nausea, vomiting and diarrhea lower abd pain,.  Pt reports   eating  from outside, including  sea food, chinese  food  and salads, denies recent travel or sick contacts/    seen in er, has  no  abd  pain  now    Loose non bloody diarrhea x1 week   Possible gastroenteritis  -continues to be afebrile  -no recent antibiotics   -lactic acid- wnl  -CT of abd-reviewed showing ?ileus or enteritis   -GI PCR -shows E.COLI   -stool cultures pending  -antibiotics discontinued, s/p cipro and flagyl since patient improving   -continue regular diet and monitor       Hypokalemia  -resolved      HTN-elevated  -restarted on norvasc, continue metoprolol  -hydralazine prn       *  on  dvt ppx/ s/q  heparin    DISPO--if improves fully by tomorrow with no diarrhea most likely can be discharged

## 2022-10-10 LAB
CULTURE RESULTS: SIGNIFICANT CHANGE UP
SPECIMEN SOURCE: SIGNIFICANT CHANGE UP

## 2022-10-10 PROCEDURE — 99232 SBSQ HOSP IP/OBS MODERATE 35: CPT

## 2022-10-10 RX ORDER — BENZOCAINE AND MENTHOL 5; 1 G/100ML; G/100ML
1 LIQUID ORAL THREE TIMES A DAY
Refills: 0 | Status: DISCONTINUED | OUTPATIENT
Start: 2022-10-10 | End: 2022-10-13

## 2022-10-10 RX ORDER — HYDRALAZINE HCL 50 MG
50 TABLET ORAL EVERY 8 HOURS
Refills: 0 | Status: DISCONTINUED | OUTPATIENT
Start: 2022-10-10 | End: 2022-10-13

## 2022-10-10 RX ORDER — NIFEDIPINE 30 MG
60 TABLET, EXTENDED RELEASE 24 HR ORAL DAILY
Refills: 0 | Status: DISCONTINUED | OUTPATIENT
Start: 2022-10-11 | End: 2022-10-11

## 2022-10-10 RX ADMIN — Medication 650 MILLIGRAM(S): at 12:11

## 2022-10-10 RX ADMIN — HEPARIN SODIUM 5000 UNIT(S): 5000 INJECTION INTRAVENOUS; SUBCUTANEOUS at 06:20

## 2022-10-10 RX ADMIN — Medication 50 MILLIGRAM(S): at 23:25

## 2022-10-10 RX ADMIN — Medication 25 MILLIGRAM(S): at 06:20

## 2022-10-10 RX ADMIN — Medication 25 MILLIGRAM(S): at 13:07

## 2022-10-10 RX ADMIN — BENZOCAINE AND MENTHOL 1 LOZENGE: 5; 1 LIQUID ORAL at 03:33

## 2022-10-10 RX ADMIN — SODIUM CHLORIDE 80 MILLILITER(S): 9 INJECTION, SOLUTION INTRAVENOUS at 06:20

## 2022-10-10 RX ADMIN — Medication 650 MILLIGRAM(S): at 04:18

## 2022-10-10 RX ADMIN — Medication 1 TABLET(S): at 06:21

## 2022-10-10 RX ADMIN — Medication 1 TABLET(S): at 17:17

## 2022-10-10 RX ADMIN — Medication 650 MILLIGRAM(S): at 17:17

## 2022-10-10 RX ADMIN — HEPARIN SODIUM 5000 UNIT(S): 5000 INJECTION INTRAVENOUS; SUBCUTANEOUS at 17:18

## 2022-10-10 RX ADMIN — Medication 650 MILLIGRAM(S): at 18:48

## 2022-10-10 RX ADMIN — Medication 50 MILLIGRAM(S): at 06:21

## 2022-10-10 RX ADMIN — AMLODIPINE BESYLATE 5 MILLIGRAM(S): 2.5 TABLET ORAL at 06:21

## 2022-10-10 RX ADMIN — Medication 650 MILLIGRAM(S): at 11:17

## 2022-10-10 RX ADMIN — PANTOPRAZOLE SODIUM 40 MILLIGRAM(S): 20 TABLET, DELAYED RELEASE ORAL at 06:21

## 2022-10-10 RX ADMIN — Medication 650 MILLIGRAM(S): at 03:18

## 2022-10-10 NOTE — PROGRESS NOTE ADULT - ASSESSMENT
52 year old female pt  states  she has no  pmh/ takes  no meds however,  was  told   to take bp meds, that  she ha s refused, presents today , c/o one week h/o intermittent nausea, vomiting and diarrhea lower abd pain,.  Pt reports   eating  from outside, including  sea food, chinese  food  and salads, denies recent travel or sick contacts/    seen in er, has  no  abd  pain  now    Loose non bloody diarrhea x1 week   Possible gastroenteritis  -continues to be afebrile  -no recent antibiotics   -lactic acid- wnl  -CT of abd-reviewed showing ?ileus or enteritis   -GI PCR -shows E.COLI   -stool cultures pending  -antibiotics discontinued, s/p cipro and flagyl since patient improving   -continue regular diet and monitor diarrhea      Hypokalemia  -resolved      HTN-elevated  -restarted on norvasc, continue metoprolol  -hydralazine prn  ---bp still moderately elevated---will switch to nifedipine er--continue metoprolol- inc hydralazine        *  on  dvt ppx/ s/q  heparin, pt mobile     DISPO--hopefully tomorrow    Transition of Care Plan:    RUR: 24%  Disposition:[facility] today  Follow up appointments: per [facility]  DME needed:none  Transportation at [address] or means to access home:   homeless     IM Medicare Letter: delivered prior to discharge  Is patient a  and connected with the [place]? N/a     If yes, was [employer] transfer form completed and [facility] notified? Caregiver Contact: Rich Gaytan sister - [phone]  Discharge Caregiver contacted prior to discharge? No per patient request  Care Conference needed?:   no    CM received confirmation this am that patient accepted to [facility] pt has received, reviewed, and signed 2nd IM letter informing them of their right to appeal the discharge. Signed copied has been placed on pt bedside chart. Medications obtained from [address] [place] [address] to go with patient. Roundtrip arranged for 3:30 pm pick-up.  JUANJOSE Gil

## 2022-10-10 NOTE — PROGRESS NOTE ADULT - SUBJECTIVE AND OBJECTIVE BOX
Patient is a 52y old  Female who presents with a chief complaint of diarrhea (08 Oct 2022 12:30)      INTERVAL HPI/OVERNIGHT EVENTS: Pt states she feels well- but still had one episode of diarrhea today   Labs stable  Vitals- stable   As per nursing- pt had multiple episodes of diarrhea yesterday, but today only one time   BP still moderately elevated     MEDICATIONS  (STANDING):  amLODIPine   Tablet 5 milliGRAM(s) Oral daily  heparin   Injectable 5000 Unit(s) SubCutaneous every 12 hours  lactated ringers. 1000 milliLiter(s) (80 mL/Hr) IV Continuous <Continuous>  lactobacillus acidophilus 1 Tablet(s) Oral two times a day  metoprolol succinate ER 50 milliGRAM(s) Oral daily  pantoprazole    Tablet 40 milliGRAM(s) Oral before breakfast    MEDICATIONS  (PRN):  acetaminophen     Tablet .. 650 milliGRAM(s) Oral every 6 hours PRN Temp greater or equal to 38C (100.4F), Mild Pain (1 - 3)      Allergies    No Known Allergies    Intolerances        REVIEW OF SYSTEMS:  CONSTITUTIONAL: No fever, weight loss, or fatigue  EYES: No eye pain, visual disturbances, or discharge  ENMT:  No difficulty hearing, tinnitus, vertigo; No sinus or throat pain  NECK: No pain or stiffness  BREASTS: No pain, masses, or nipple discharge  RESPIRATORY: No cough, wheezing, chills or hemoptysis; No shortness of breath  CARDIOVASCULAR: No chest pain, palpitations, dizziness, or leg swelling  GASTROINTESTINAL: No abdominal or epigastric pain. No nausea, vomiting, or hematemesis; No diarrhea or constipation. No melena or hematochezia.  GENITOURINARY: No dysuria, frequency, hematuria, or incontinence  NEUROLOGICAL: No headaches, memory loss, loss of strength, numbness, or tremors  SKIN: No itching, burning, rashes, or lesions   LYMPH NODES: No enlarged glands  ENDOCRINE: No heat or cold intolerance; No hair loss  MUSCULOSKELETAL: No joint pain or swelling; No muscle, back, or extremity pain  PSYCHIATRIC: No depression, anxiety, mood swings, or difficulty sleeping  HEME/LYMPH: No easy bruising, or bleeding gums  ALLERGY AND IMMUNOLOGIC: No hives or eczema    Vital Signs Last 24 Hrs  ICU Vital Signs Last 24 Hrs  T(C): 36.7 (10 Oct 2022 11:02), Max: 37 (09 Oct 2022 23:47)  T(F): 98 (10 Oct 2022 11:02), Max: 98.6 (09 Oct 2022 23:47)  HR: 77 (10 Oct 2022 11:02) (64 - 77)  BP: 165/82 (10 Oct 2022 11:02) (144/82 - 182/103)  BP(mean): --  ABP: --  ABP(mean): --  RR: 18 (10 Oct 2022 11:02) (18 - 18)  SpO2: 96% (10 Oct 2022 11:02) (96% - 98%)    O2 Parameters below as of 10 Oct 2022 11:02  Patient On (Oxygen Delivery Method): room air    PHYSICAL EXAM:  GENERAL: NAD, well-groomed, well-developed  HEAD:  Atraumatic, Normocephalic  EYES: EOMI, PERRLA, conjunctiva and sclera clear  ENMT: No tonsillar erythema, exudates, or enlargement; Moist mucous membranes, Good dentition, No lesions  NECK: Supple, No JVD, Normal thyroid  NERVOUS SYSTEM:  Alert & Oriented X3, Good concentration; Motor Strength 5/5 B/L upper and lower extremities; DTRs 2+ intact and symmetric  CHEST/LUNG: Clear to percussion bilaterally; No rales, rhonchi, wheezing, or rubs  HEART: Regular rate and rhythm; No murmurs, rubs, or gallops  ABDOMEN: Soft, Nontender, Nondistended; Bowel sounds present  EXTREMITIES:  2+ Peripheral Pulses, No clubbing, cyanosis, or edema  LYMPH: No lymphadenopathy noted  SKIN: No rashes or lesions    LABS:                                             13.4   5.98  )-----------( 168      ( 09 Oct 2022 07:26 )             41.0     10-09    145  |  112<H>  |  11  ----------------------------<  72  3.8   |  27  |  0.74    Ca    8.6      09 Oct 2022 07:00

## 2022-10-11 LAB
ANION GAP SERPL CALC-SCNC: 10 MMOL/L — SIGNIFICANT CHANGE UP (ref 5–17)
BUN SERPL-MCNC: 7 MG/DL — SIGNIFICANT CHANGE UP (ref 7–23)
CALCIUM SERPL-MCNC: 9.2 MG/DL — SIGNIFICANT CHANGE UP (ref 8.5–10.1)
CHLORIDE SERPL-SCNC: 106 MMOL/L — SIGNIFICANT CHANGE UP (ref 96–108)
CO2 SERPL-SCNC: 25 MMOL/L — SIGNIFICANT CHANGE UP (ref 22–31)
CREAT SERPL-MCNC: 0.62 MG/DL — SIGNIFICANT CHANGE UP (ref 0.5–1.3)
CULTURE RESULTS: SIGNIFICANT CHANGE UP
EGFR: 108 ML/MIN/1.73M2 — SIGNIFICANT CHANGE UP
GLUCOSE SERPL-MCNC: 98 MG/DL — SIGNIFICANT CHANGE UP (ref 70–99)
HCT VFR BLD CALC: 45.2 % — HIGH (ref 34.5–45)
HGB BLD-MCNC: 14.9 G/DL — SIGNIFICANT CHANGE UP (ref 11.5–15.5)
MAGNESIUM SERPL-MCNC: 2.4 MG/DL — SIGNIFICANT CHANGE UP (ref 1.6–2.6)
MCHC RBC-ENTMCNC: 31.3 PG — SIGNIFICANT CHANGE UP (ref 27–34)
MCHC RBC-ENTMCNC: 33 G/DL — SIGNIFICANT CHANGE UP (ref 32–36)
MCV RBC AUTO: 95 FL — SIGNIFICANT CHANGE UP (ref 80–100)
NRBC # BLD: 0 /100 WBCS — SIGNIFICANT CHANGE UP (ref 0–0)
PHOSPHATE SERPL-MCNC: 3.5 MG/DL — SIGNIFICANT CHANGE UP (ref 2.5–4.5)
PLATELET # BLD AUTO: 236 K/UL — SIGNIFICANT CHANGE UP (ref 150–400)
POTASSIUM SERPL-MCNC: 3.2 MMOL/L — LOW (ref 3.5–5.3)
POTASSIUM SERPL-SCNC: 3.2 MMOL/L — LOW (ref 3.5–5.3)
RBC # BLD: 4.76 M/UL — SIGNIFICANT CHANGE UP (ref 3.8–5.2)
RBC # FLD: 13.5 % — SIGNIFICANT CHANGE UP (ref 10.3–14.5)
SODIUM SERPL-SCNC: 141 MMOL/L — SIGNIFICANT CHANGE UP (ref 135–145)
SPECIMEN SOURCE: SIGNIFICANT CHANGE UP
WBC # BLD: 6.87 K/UL — SIGNIFICANT CHANGE UP (ref 3.8–10.5)
WBC # FLD AUTO: 6.87 K/UL — SIGNIFICANT CHANGE UP (ref 3.8–10.5)

## 2022-10-11 PROCEDURE — 99232 SBSQ HOSP IP/OBS MODERATE 35: CPT

## 2022-10-11 RX ORDER — CHOLESTYRAMINE 4 G/9G
4 POWDER, FOR SUSPENSION ORAL
Refills: 0 | Status: DISCONTINUED | OUTPATIENT
Start: 2022-10-11 | End: 2022-10-13

## 2022-10-11 RX ORDER — SODIUM CHLORIDE 9 MG/ML
1000 INJECTION, SOLUTION INTRAVENOUS
Refills: 0 | Status: DISCONTINUED | OUTPATIENT
Start: 2022-10-11 | End: 2022-10-13

## 2022-10-11 RX ORDER — NIFEDIPINE 30 MG
90 TABLET, EXTENDED RELEASE 24 HR ORAL DAILY
Refills: 0 | Status: DISCONTINUED | OUTPATIENT
Start: 2022-10-12 | End: 2022-10-13

## 2022-10-11 RX ORDER — SODIUM CHLORIDE 9 MG/ML
1000 INJECTION INTRAMUSCULAR; INTRAVENOUS; SUBCUTANEOUS
Refills: 0 | Status: DISCONTINUED | OUTPATIENT
Start: 2022-10-11 | End: 2022-10-11

## 2022-10-11 RX ORDER — NICOTINE POLACRILEX 2 MG
1 GUM BUCCAL DAILY
Refills: 0 | Status: DISCONTINUED | OUTPATIENT
Start: 2022-10-11 | End: 2022-10-13

## 2022-10-11 RX ORDER — ACETAMINOPHEN 500 MG
1000 TABLET ORAL ONCE
Refills: 0 | Status: COMPLETED | OUTPATIENT
Start: 2022-10-11 | End: 2022-10-11

## 2022-10-11 RX ORDER — POTASSIUM CHLORIDE 20 MEQ
40 PACKET (EA) ORAL ONCE
Refills: 0 | Status: COMPLETED | OUTPATIENT
Start: 2022-10-11 | End: 2022-10-11

## 2022-10-11 RX ORDER — SODIUM CHLORIDE 9 MG/ML
1000 INJECTION, SOLUTION INTRAVENOUS
Refills: 0 | Status: DISCONTINUED | OUTPATIENT
Start: 2022-10-12 | End: 2022-10-13

## 2022-10-11 RX ADMIN — Medication 1 TABLET(S): at 17:04

## 2022-10-11 RX ADMIN — Medication 60 MILLIGRAM(S): at 05:58

## 2022-10-11 RX ADMIN — SODIUM CHLORIDE 125 MILLILITER(S): 9 INJECTION, SOLUTION INTRAVENOUS at 17:20

## 2022-10-11 RX ADMIN — Medication 1 TABLET(S): at 05:57

## 2022-10-11 RX ADMIN — Medication 1000 MILLIGRAM(S): at 06:57

## 2022-10-11 RX ADMIN — Medication 50 MILLIGRAM(S): at 13:09

## 2022-10-11 RX ADMIN — PANTOPRAZOLE SODIUM 40 MILLIGRAM(S): 20 TABLET, DELAYED RELEASE ORAL at 07:30

## 2022-10-11 RX ADMIN — Medication 40 MILLIEQUIVALENT(S): at 17:04

## 2022-10-11 RX ADMIN — Medication 50 MILLIGRAM(S): at 05:57

## 2022-10-11 RX ADMIN — HEPARIN SODIUM 5000 UNIT(S): 5000 INJECTION INTRAVENOUS; SUBCUTANEOUS at 17:06

## 2022-10-11 RX ADMIN — Medication 50 MILLIGRAM(S): at 05:58

## 2022-10-11 RX ADMIN — Medication 50 MILLIGRAM(S): at 23:45

## 2022-10-11 RX ADMIN — Medication 650 MILLIGRAM(S): at 17:04

## 2022-10-11 RX ADMIN — Medication 650 MILLIGRAM(S): at 01:12

## 2022-10-11 RX ADMIN — Medication 15 MILLILITER(S): at 17:06

## 2022-10-11 RX ADMIN — CHOLESTYRAMINE 4 GRAM(S): 4 POWDER, FOR SUSPENSION ORAL at 23:46

## 2022-10-11 RX ADMIN — Medication 650 MILLIGRAM(S): at 02:06

## 2022-10-11 RX ADMIN — Medication 650 MILLIGRAM(S): at 17:28

## 2022-10-11 RX ADMIN — Medication 1 PATCH: at 23:45

## 2022-10-11 RX ADMIN — Medication 400 MILLIGRAM(S): at 06:27

## 2022-10-11 NOTE — PROGRESS NOTE ADULT - ASSESSMENT
52 year old female pt  states  she has no  pmh/ takes  no meds however,  was  told   to take bp meds, that  she ha s refused, presents today , c/o one week h/o intermittent nausea, vomiting and diarrhea lower abd pain,.  Pt reports   eating  from outside, including  sea food, chinese  food  and salads, denies recent travel or sick contacts/    seen in er, has  no  abd  pain  now    Loose non bloody diarrhea x1 week   Possible gastroenteritis  -continues to be afebrile  -no recent antibiotics   -lactic acid- wnl  -CT of abd-reviewed showing ?ileus or enteritis   -GI PCR -shows E.COLI   -stool cultures negative  -antibiotics discontinued, s/p cipro and flagyl since patient was improving  -since now nauseas- will switch to clear liquid diet, also obtain gi consult -Dr. luna       Hypokalemia  -continue to supplement      HTN-elevated  -still moderate elevated  -continue to upgrade bp meds until bp at goal        *  on  dvt ppx/ s/q  heparin, pt also mobile

## 2022-10-11 NOTE — CONSULT NOTE ADULT - SUBJECTIVE AND OBJECTIVE BOX
HPI:  52 year old female   pt  states  she has no  pmh/ takes  no meds   however,  was  told   to take bp meds, that  she ha s refused      presents today , c/o one week h/o intermittent nausea, vomiting and diarrhea lower abd pain,.    pt reports   eating  from outside, including  sea food, chinese  food  and salads    denies recent travel or sick contacts  pt  seen in er, ha sno abd  pain  now (08 Oct 2022 12:30)  ---- As Above -------  The patient was in her USOH      PAST MEDICAL & SURGICAL HISTORY:  GERD (gastroesophageal reflux disease)      History of cholecystectomy          MEDICATIONS  (STANDING):  bismuth subsalicylate Liquid 15 milliLiter(s) Oral every 12 hours  cholestyramine Powder (Sugar-Free) 4 Gram(s) Oral two times a day  dextrose 5% + sodium chloride 0.9%. 1000 milliLiter(s) (125 mL/Hr) IV Continuous <Continuous>  heparin   Injectable 5000 Unit(s) SubCutaneous every 12 hours  hydrALAZINE 50 milliGRAM(s) Oral every 8 hours  lactobacillus acidophilus 1 Tablet(s) Oral two times a day  metoprolol succinate ER 50 milliGRAM(s) Oral daily  pantoprazole    Tablet 40 milliGRAM(s) Oral before breakfast    MEDICATIONS  (PRN):  acetaminophen     Tablet .. 650 milliGRAM(s) Oral every 6 hours PRN Temp greater or equal to 38C (100.4F), Mild Pain (1 - 3)  benzocaine 15 mG/menthol 3.6 mG Lozenge 1 Lozenge Oral three times a day PRN Sore Throat      Allergies    No Known Allergies    Intolerances        FAMILY HISTORY:  Family history of hypertension    Family history of diabetes mellitus    Family history of Parkinson&#x27;s disease    Family history of sarcoidosis (Sibling)        REVIEW OF SYSTEMS:    CONSTITUTIONAL: No fever, weight loss, or fatigue  EYES: No eye pain, visual disturbances, or discharge  ENMT:  No difficulty hearing, tinnitus, vertigo; No sinus or throat pain  NECK: No pain or stiffness  BREASTS: No pain, masses, or nipple discharge  RESPIRATORY: No cough, wheezing, chills or hemoptysis; No shortness of breath  CARDIOVASCULAR: No chest pain, palpitations, dizziness, or leg swelling  GASTROINTESTINAL: No abdominal or epigastric pain. No nausea, vomiting, or hematemesis; No diarrhea or constipation. No melena or hematochezia.  GENITOURINARY: No dysuria, frequency, hematuria, or incontinence  NEUROLOGICAL: No headaches, memory loss, loss of strength, numbness, or tremors  SKIN: No itching, burning, rashes, or lesions   LYMPH NODES: No enlarged glands  ENDOCRINE: No heat or cold intolerance; No hair loss  MUSCULOSKELETAL: No joint pain or swelling; No muscle, back, or extremity pain  PSYCHIATRIC: No depression, anxiety, mood swings, or difficulty sleeping  HEME/LYMPH: No easy bruising, or bleeding gums  ALLERGY AND IMMUNOLOGIC: No hives or eczema          SOCIAL HISTORY:    FAMILY HISTORY:  Family history of hypertension    Family history of diabetes mellitus    Family history of Parkinson&#x27;s disease    Family history of sarcoidosis (Sibling)        Vital Signs Last 24 Hrs  T(C): 36.8 (11 Oct 2022 11:07), Max: 37.3 (11 Oct 2022 05:17)  T(F): 98.3 (11 Oct 2022 11:07), Max: 99.1 (11 Oct 2022 05:17)  HR: 82 (11 Oct 2022 11:07) (73 - 82)  BP: 149/88 (11 Oct 2022 11:07) (149/88 - 169/76)  BP(mean): --  RR: 18 (11 Oct 2022 11:07) (16 - 18)  SpO2: 97% (11 Oct 2022 11:07) (96% - 99%)    Parameters below as of 11 Oct 2022 11:07  Patient On (Oxygen Delivery Method): room air        PHYSICAL EXAM:    GENERAL: NAD, well-groomed, well-developed  HEAD:  Atraumatic, Normocephalic  EYES: EOMI, PERRLA, conjunctiva and sclera clear  ENMT: No tonsillar erythema, exudates, or enlargement; Moist mucous membranes, Good dentition, No lesions  NECK: Supple, No JVD, Normal thyroid  NERVOUS SYSTEM:  Alert & Oriented X3, Good concentration; Motor Strength 5/5 B/L upper and lower extremities; DTRs 2+ intact and symmetric  CHEST/LUNG: Clear to percussion bilaterally; No rales, rhonchi, wheezing, or rubs  HEART: Regular rate and rhythm; No murmurs, rubs, or gallops  ABDOMEN: Soft, Nontender, Nondistended; Bowel sounds present  EXTREMITIES:  2+ Peripheral Pulses, No clubbing, cyanosis, or edema  LYMPH: No lymphadenopathy noted   RECTAL: No masses, prostate normal size and smooth, Guaiaci negative   BREAST: No palpable masses, skin no lesions no retractions, no discharges. adnexal no palpable masses noted   GYN: uterus normal size, adnexal, no palpable masses, no CMT, no uterine discharge   SKIN: No rashes or lesions    LABS:                        14.9   6.87  )-----------( 236      ( 11 Oct 2022 14:19 )             45.2       CBC:  10-11 @ 14:19  WBC  6.87  HGB 14.9  HCT 45.2 Plate 236  MCV 95.0  10-09 @ 07:26  WBC  5.98  HGB 13.4  HCT 41.0 Plate 168  MCV 96.7  10-08 @ 00:30  WBC  7.41  HGB 15.7  HCT 48.1 Plate 203  MCV 97.2           11 Oct 2022 14:19    141    |  106    |  7      ----------------------------<  98     3.2     |  25     |  0.62   09 Oct 2022 07:00    145    |  112    |  11     ----------------------------<  72     3.8     |  27     |  0.74   08 Oct 2022 14:07    143    |  113    |  17     ----------------------------<  136    3.3     |  23     |  0.85   08 Oct 2022 00:30    143    |  107    |  22     ----------------------------<  133    2.9     |  27     |  1.23     Ca    9.2        11 Oct 2022 14:19  Ca    8.6        09 Oct 2022 07:00  Ca    8.4        08 Oct 2022 14:07  Ca    9.3        08 Oct 2022 00:30  Phos  3.5       11 Oct 2022 14:19  Mg     2.4       11 Oct 2022 14:19    TPro  7.6    /  Alb  4.0    /  TBili  0.5    /  DBili  x      /  AST  17     /  ALT  20     /  AlkPhos  69     08 Oct 2022 00:30            RADIOLOGY & ADDITIONAL STUDIES: HPI:  52 year old female   pt  states  she has no  pmh/ takes  no meds   however,  was  told   to take bp meds, that  she ha s refused      presents today , c/o one week h/o intermittent nausea, vomiting and diarrhea lower abd pain,.    pt reports   eating  from outside, including  sea food, chinese  food  and salads    denies recent travel or sick contacts  pt  seen in er, ha sno abd  pain  now (08 Oct 2022 12:30)  ---- As Above -------  The patient was in her USOH until last week when she had one episode of diarrhea. it temporarily resolved but it got worse Wednesday. Since then, she has been having worsening diarrhea. She had been given PO antibiotics.   Since arrival, the diarrhea had been getting better until yesterday. She had been put on a solid diet and the diarrhea worsened. Today, she had one watery BM after breakfast ( Was subsequently placed back on a liquid diet ) Since then, the diarrhea has resolved. She is not on any antibiotics. patient given one dose of Pepto Bismol   Stool PCR (+) for E Coli  No prior history of any GI disease. Never had a colonoscopy.       PAST MEDICAL & SURGICAL HISTORY:  GERD (gastroesophageal reflux disease)      History of cholecystectomy          MEDICATIONS  (STANDING):  bismuth subsalicylate Liquid 15 milliLiter(s) Oral every 12 hours  cholestyramine Powder (Sugar-Free) 4 Gram(s) Oral two times a day  dextrose 5% + sodium chloride 0.9%. 1000 milliLiter(s) (125 mL/Hr) IV Continuous <Continuous>  heparin   Injectable 5000 Unit(s) SubCutaneous every 12 hours  hydrALAZINE 50 milliGRAM(s) Oral every 8 hours  lactobacillus acidophilus 1 Tablet(s) Oral two times a day  metoprolol succinate ER 50 milliGRAM(s) Oral daily  pantoprazole    Tablet 40 milliGRAM(s) Oral before breakfast    MEDICATIONS  (PRN):  acetaminophen     Tablet .. 650 milliGRAM(s) Oral every 6 hours PRN Temp greater or equal to 38C (100.4F), Mild Pain (1 - 3)  benzocaine 15 mG/menthol 3.6 mG Lozenge 1 Lozenge Oral three times a day PRN Sore Throat      Allergies    No Known Allergies    Intolerances        FAMILY HISTORY:  Family history of hypertension    Family history of diabetes mellitus    Family history of Parkinson&#x27;s disease    Family history of sarcoidosis (Sibling)        REVIEW OF SYSTEMS:  Headache (+)    CONSTITUTIONAL: No fever, weight loss,   EYES: No eye pain, visual disturbances, or discharge  ENMT:  No difficulty hearing, tinnitus, vertigo; No sinus or throat pain  NECK: No pain or stiffness  BREASTS: No pain, masses, or nipple discharge  RESPIRATORY: No cough, wheezing, chills or hemoptysis; No shortness of breath  CARDIOVASCULAR: No chest pain, palpitations, dizziness, or leg swelling  GASTROINTESTINAL: See above   GENITOURINARY: No dysuria, frequency, hematuria, or incontinence  NEUROLOGICAL: No  memory loss, loss of strength, numbness, or tremors  SKIN: No itching, burning, rashes, or lesions   LYMPH NODES: No enlarged glands  ENDOCRINE: No heat or cold intolerance; No hair loss  MUSCULOSKELETAL: No joint pain or swelling; No muscle, back, or extremity pain  PSYCHIATRIC: No depression, anxiety, mood swings, or difficulty sleeping  HEME/LYMPH: No easy bruising, or bleeding gums  ALLERGY AND IMMUNOLOGIC: No hives or eczema          SOCIAL HISTORY:    FAMILY HISTORY:  Family history of hypertension    Family history of diabetes mellitus    Family history of Parkinson&#x27;s disease    Family history of sarcoidosis (Sibling)        Vital Signs Last 24 Hrs  T(C): 36.8 (11 Oct 2022 11:07), Max: 37.3 (11 Oct 2022 05:17)  T(F): 98.3 (11 Oct 2022 11:07), Max: 99.1 (11 Oct 2022 05:17)  HR: 82 (11 Oct 2022 11:07) (73 - 82)  BP: 149/88 (11 Oct 2022 11:07) (149/88 - 169/76)  BP(mean): --  RR: 18 (11 Oct 2022 11:07) (16 - 18)  SpO2: 97% (11 Oct 2022 11:07) (96% - 99%)    Parameters below as of 11 Oct 2022 11:07  Patient On (Oxygen Delivery Method): room air        PHYSICAL EXAM:    GENERAL: NAD, well-groomed, well-developed  HEAD:  Atraumatic, Normocephalic  EYES: EOMI, PERRLA, conjunctiva and sclera clear  NECK: Supple, No JVD, Normal thyroid  NERVOUS SYSTEM:  Alert & Oriented X3, Good concentration; Motor Strength 5/5 B/L upper and lower extremities  CHEST/LUNG: Clear to percussion bilaterally; No rales, rhonchi, wheezing, or rubs  HEART: Regular rate and rhythm; No murmurs, rubs, or gallops  ABDOMEN: Soft, Nontender, Nondistended; Bowel sounds present  EXTREMITIES:  2+ Peripheral Pulses, No clubbing, cyanosis, or edema  LYMPH: No lymphadenopathy noted   RECTAL:  Deferred   SKIN: No rashes or lesions    LABS:                        14.9   6.87  )-----------( 236      ( 11 Oct 2022 14:19 )             45.2       CBC:  10-11 @ 14:19  WBC  6.87  HGB 14.9  HCT 45.2 Plate 236  MCV 95.0  10-09 @ 07:26  WBC  5.98  HGB 13.4  HCT 41.0 Plate 168  MCV 96.7  10-08 @ 00:30  WBC  7.41  HGB 15.7  HCT 48.1 Plate 203  MCV 97.2           11 Oct 2022 14:19    141    |  106    |  7      ----------------------------<  98     3.2     |  25     |  0.62   09 Oct 2022 07:00    145    |  112    |  11     ----------------------------<  72     3.8     |  27     |  0.74   08 Oct 2022 14:07    143    |  113    |  17     ----------------------------<  136    3.3     |  23     |  0.85   08 Oct 2022 00:30    143    |  107    |  22     ----------------------------<  133    2.9     |  27     |  1.23     Ca    9.2        11 Oct 2022 14:19  Ca    8.6        09 Oct 2022 07:00  Ca    8.4        08 Oct 2022 14:07  Ca    9.3        08 Oct 2022 00:30  Phos  3.5       11 Oct 2022 14:19  Mg     2.4       11 Oct 2022 14:19    TPro  7.6    /  Alb  4.0    /  TBili  0.5    /  DBili  x      /  AST  17     /  ALT  20     /  AlkPhos  69     08 Oct 2022 00:30            RADIOLOGY & ADDITIONAL STUDIES:  < from: CT Abdomen and Pelvis w/ IV Cont (10.08.22 @ 02:12) >    ACC: [id number] EXAM:  CT ABDOMEN AND PELVIS IC                          PROCEDURE DATE:  10/08/2022          INTERPRETATION:  CLINICAL INFORMATION: Fever. Diarrhea and abdominal pain.    COMPARISON: CT abdomen pelvis 12/29/2020.    CONTRAST/COMPLICATIONS:  IV Contrast: Omnipaque 350  97 cc administered  Oral Contrast: NONE  Complications: None reported at time of study completion    PROCEDURE:  CT of the Abdomen and Pelvis was performed.  Sagittal and coronal reformats were performed.    FINDINGS:  LOWER CHEST: Trace right basilar and lingular atelectasis.    LIVER: Steatosis. Too small to characterize right hepatic lobe   hypodensity.  BILE DUCTS: Stable mild prominence of the common bile duct, likely due to   cholecystectomy.  GALLBLADDER:Cholecystectomy.  SPLEEN: Within normal limits.  PANCREAS: Within normal limits.  ADRENALS: Reidentified indeterminate bilateral adrenal nodules, right   measures up to 1.4 cm left measures up to 1.8 cm. Consider nonemergent   correlation with MR.  KIDNEYS/URETERS: No hydronephrosis.    BLADDER: Mild wall thickening, difficult to assess secondary to   inadequate distention.  REPRODUCTIVE ORGANS: Globular uterus, likely containing fibroids.    BOWEL: Mildly prominent fluid-filled small bowel loopswithout discrete   transition point identified. Diffuse long segmental wall   thickening/hyperemia of distal jejunoileal folds with adjacent   perienteric stranding and mesenteric edema. Findings are nonspecific,   likely represent nonspecific enteritis, favor infectious or inflammatory.   Inflammatory bowel disease considered in the differential. Correlate with   lactic acid. No bowel obstruction. Normal appendix.  PERITONEUM: Mild ascites of low attenuation.  VESSELS: Normal aortic caliber. Atherosclerotic changes. Origin of the   celiac and SMA and MATHEW axes appears patent.  RETROPERITONEUM/LYMPH NODES: No lymphadenopathy.  ABDOMINAL WALL: Within normal limits.  BONES: Within normal limits.    IMPRESSION:    Mildly prominent fluid-filled small bowel loops without discrete   transition point identified, likely ileus.    Diffuse long segmental wall thickening/hyperemia of distal jejunoileal   folds with adjacent perienteric stranding and mesenteric edema. Findings   are nonspecific, likely represent nonspecific enteritis, favor infectious   or inflammatory. Inflammatory bowel disease considered in the   differential. Correlate with lactic acid.    Mild ascites.    Additional findings as mentioned above.    --- End of Report ---            MIGUEL SÁNCHEZ MD; Attending Radiologist  This document has been electronically signed. Oct  8 2022  2:58AM    < end of copied text >

## 2022-10-11 NOTE — PROGRESS NOTE ADULT - SUBJECTIVE AND OBJECTIVE BOX
Patient is a 52y old  Female who presents with a chief complaint of diarrhea (08 Oct 2022 12:30)      INTERVAL HPI/OVERNIGHT EVENTS: Today patient started feeling ill- very nauseas and had two episodes of diarrhea as per nurse   Labs stable except for low K  Vitals- stable     MEDICATIONS  (STANDING):  heparin   Injectable 5000 Unit(s) SubCutaneous every 12 hours  hydrALAZINE 50 milliGRAM(s) Oral every 8 hours  lactobacillus acidophilus 1 Tablet(s) Oral two times a day  metoprolol succinate ER 50 milliGRAM(s) Oral daily  NIFEdipine XL 60 milliGRAM(s) Oral daily  pantoprazole    Tablet 40 milliGRAM(s) Oral before breakfast  potassium chloride    Tablet ER 40 milliEquivalent(s) Oral once  sodium chloride 0.9%. 1000 milliLiter(s) (75 mL/Hr) IV Continuous <Continuous>    MEDICATIONS  (PRN):  acetaminophen     Tablet .. 650 milliGRAM(s) Oral every 6 hours PRN Temp greater or equal to 38C (100.4F), Mild Pain (1 - 3)  benzocaine 15 mG/menthol 3.6 mG Lozenge 1 Lozenge Oral three times a day PRN Sore Throat      Allergies    No Known Allergies    Intolerances        REVIEW OF SYSTEMS:  CONSTITUTIONAL: No fever, weight loss, or fatigue  EYES: No eye pain, visual disturbances, or discharge  ENMT:  No difficulty hearing, tinnitus, vertigo; No sinus or throat pain  NECK: No pain or stiffness  BREASTS: No pain, masses, or nipple discharge  RESPIRATORY: No cough, wheezing, chills or hemoptysis; No shortness of breath  CARDIOVASCULAR: No chest pain, palpitations, dizziness, or leg swelling  GASTROINTESTINAL: No abdominal or epigastric pain. No nausea, vomiting, or hematemesis; No diarrhea or constipation. No melena or hematochezia.  GENITOURINARY: No dysuria, frequency, hematuria, or incontinence  NEUROLOGICAL: No headaches, memory loss, loss of strength, numbness, or tremors  SKIN: No itching, burning, rashes, or lesions   LYMPH NODES: No enlarged glands  ENDOCRINE: No heat or cold intolerance; No hair loss  MUSCULOSKELETAL: No joint pain or swelling; No muscle, back, or extremity pain  PSYCHIATRIC: No depression, anxiety, mood swings, or difficulty sleeping  HEME/LYMPH: No easy bruising, or bleeding gums  ALLERGY AND IMMUNOLOGIC: No hives or eczema    Vital Signs Last 24 Hrs  ICU Vital Signs Last 24 Hrs  T(C): 36.8 (11 Oct 2022 11:07), Max: 37.3 (11 Oct 2022 05:17)  T(F): 98.3 (11 Oct 2022 11:07), Max: 99.1 (11 Oct 2022 05:17)  HR: 82 (11 Oct 2022 11:07) (73 - 82)  BP: 149/88 (11 Oct 2022 11:07) (149/88 - 169/76)  BP(mean): --  ABP: --  ABP(mean): --  RR: 18 (11 Oct 2022 11:07) (16 - 18)  SpO2: 97% (11 Oct 2022 11:07) (96% - 99%)    O2 Parameters below as of 11 Oct 2022 11:07  Patient On (Oxygen Delivery Method): room air            PHYSICAL EXAM:  GENERAL: NAD, well-groomed, well-developed  HEAD:  Atraumatic, Normocephalic  EYES: EOMI, PERRLA, conjunctiva and sclera clear  ENMT: No tonsillar erythema, exudates, or enlargement; Moist mucous membranes, Good dentition, No lesions  NECK: Supple, No JVD, Normal thyroid  NERVOUS SYSTEM:  Alert & Oriented X3, Good concentration; Motor Strength 5/5 B/L upper and lower extremities; DTRs 2+ intact and symmetric  CHEST/LUNG: Clear to percussion bilaterally; No rales, rhonchi, wheezing, or rubs  HEART: Regular rate and rhythm; No murmurs, rubs, or gallops  ABDOMEN: Soft, Nontender, Nondistended; Bowel sounds present  EXTREMITIES:  2+ Peripheral Pulses, No clubbing, cyanosis, or edema  LYMPH: No lymphadenopathy noted  SKIN: No rashes or lesions    LABS:                                                        14.9   6.87  )-----------( 236      ( 11 Oct 2022 14:19 )             45.2     10-11    141  |  106  |  7   ----------------------------<  98  3.2<L>   |  25  |  0.62    Ca    9.2      11 Oct 2022 14:19  Phos  3.5     10-11  Mg     2.4     10-11

## 2022-10-11 NOTE — CONSULT NOTE ADULT - ASSESSMENT
HPI:  52 year old female   pt  states  she has no  pmh/ takes  no meds   however,  was  told   to take bp meds, that  she ha s refused      presents today , c/o one week h/o intermittent nausea, vomiting and diarrhea lower abd pain,.    pt reports   eating  from outside, including  sea food, chinese  food  and salads    denies recent travel or sick contacts  pt  seen in er, ha snuzair abd  pain  now (08 Oct 2022 12:30)  ---- As Above -------  The patient was in her USOH until last week when she had one episode of diarrhea. it temporarily resolved but it got worse Wednesday. Since then, she has been having worsening diarrhea. She had been given PO antibiotics.   Since arrival, the diarrhea had been getting better until yesterday. She had been put on a solid diet and the diarrhea worsened. Today, she had one watery BM after breakfast ( Was subsequently placed back on a liquid diet ) Since then, the diarrhea has resolved. She is not on any antibiotics. patient given one dose of Pepto Bismol   Stool PCR (+) for E Coli  No prior history of any GI disease. Never had a colonoscopy.     Diarrhea, E Coli by PCR, was getting better - 1) Clear liquid diet 2) Hold antibiotics for 3) Pepto Bismo x 2 doses 4) Change IV fluids to D51/2 5) Flex sig if no improvement  6) CRP HPI:  52 year old female   pt  states  she has no  pmh/ takes  no meds   however,  was  told   to take bp meds, that  she ha s refused      presents today , c/o one week h/o intermittent nausea, vomiting and diarrhea lower abd pain,.    pt reports   eating  from outside, including  sea food, chinese  food  and salads    denies recent travel or sick contacts  pt  seen in er, ha snuzair abd  pain  now (08 Oct 2022 12:30)  ---- As Above -------  The patient was in her USOH until last week when she had one episode of diarrhea. it temporarily resolved but it got worse Wednesday. Since then, she has been having worsening diarrhea. She had been given PO antibiotics.   Since arrival, the diarrhea had been getting better until yesterday. She had been put on a solid diet and the diarrhea worsened. Today, she had one watery BM after breakfast ( Was subsequently placed back on a liquid diet ) Since then, the diarrhea has resolved. She is not on any antibiotics. patient given one dose of Pepto Bismol   Stool PCR (+) for E Coli  No prior history of any GI disease. Never had a colonoscopy.     Diarrhea, E Coli by PCR, was getting better - 1) Clear liquid diet 2) Hold antibiotics for now 3) Pepto Bismo x 3 doses 4) Change IV fluids to D51/2 5) Flex sig if no improvement  6) CRP 7) stool count

## 2022-10-12 LAB
ANION GAP SERPL CALC-SCNC: 8 MMOL/L — SIGNIFICANT CHANGE UP (ref 5–17)
BUN SERPL-MCNC: 6 MG/DL — LOW (ref 7–23)
CALCIUM SERPL-MCNC: 9.2 MG/DL — SIGNIFICANT CHANGE UP (ref 8.5–10.1)
CHLORIDE SERPL-SCNC: 107 MMOL/L — SIGNIFICANT CHANGE UP (ref 96–108)
CO2 SERPL-SCNC: 26 MMOL/L — SIGNIFICANT CHANGE UP (ref 22–31)
CREAT SERPL-MCNC: 0.66 MG/DL — SIGNIFICANT CHANGE UP (ref 0.5–1.3)
EGFR: 106 ML/MIN/1.73M2 — SIGNIFICANT CHANGE UP
GLUCOSE SERPL-MCNC: 100 MG/DL — HIGH (ref 70–99)
HCT VFR BLD CALC: 46 % — HIGH (ref 34.5–45)
HGB BLD-MCNC: 14.8 G/DL — SIGNIFICANT CHANGE UP (ref 11.5–15.5)
MAGNESIUM SERPL-MCNC: 2.6 MG/DL — SIGNIFICANT CHANGE UP (ref 1.6–2.6)
MCHC RBC-ENTMCNC: 31 PG — SIGNIFICANT CHANGE UP (ref 27–34)
MCHC RBC-ENTMCNC: 32.2 G/DL — SIGNIFICANT CHANGE UP (ref 32–36)
MCV RBC AUTO: 96.4 FL — SIGNIFICANT CHANGE UP (ref 80–100)
NRBC # BLD: 0 /100 WBCS — SIGNIFICANT CHANGE UP (ref 0–0)
PLATELET # BLD AUTO: 237 K/UL — SIGNIFICANT CHANGE UP (ref 150–400)
POTASSIUM SERPL-MCNC: 3.1 MMOL/L — LOW (ref 3.5–5.3)
POTASSIUM SERPL-SCNC: 3.1 MMOL/L — LOW (ref 3.5–5.3)
RBC # BLD: 4.77 M/UL — SIGNIFICANT CHANGE UP (ref 3.8–5.2)
RBC # FLD: 13.7 % — SIGNIFICANT CHANGE UP (ref 10.3–14.5)
SODIUM SERPL-SCNC: 141 MMOL/L — SIGNIFICANT CHANGE UP (ref 135–145)
WBC # BLD: 6.91 K/UL — SIGNIFICANT CHANGE UP (ref 3.8–10.5)
WBC # FLD AUTO: 6.91 K/UL — SIGNIFICANT CHANGE UP (ref 3.8–10.5)

## 2022-10-12 PROCEDURE — 99232 SBSQ HOSP IP/OBS MODERATE 35: CPT

## 2022-10-12 RX ORDER — POTASSIUM CHLORIDE 20 MEQ
40 PACKET (EA) ORAL ONCE
Refills: 0 | Status: COMPLETED | OUTPATIENT
Start: 2022-10-12 | End: 2022-10-12

## 2022-10-12 RX ADMIN — Medication 1 PATCH: at 07:48

## 2022-10-12 RX ADMIN — Medication 1 PATCH: at 18:17

## 2022-10-12 RX ADMIN — PANTOPRAZOLE SODIUM 40 MILLIGRAM(S): 20 TABLET, DELAYED RELEASE ORAL at 07:50

## 2022-10-12 RX ADMIN — Medication 40 MILLIEQUIVALENT(S): at 12:50

## 2022-10-12 RX ADMIN — Medication 1 PATCH: at 11:38

## 2022-10-12 RX ADMIN — Medication 50 MILLIGRAM(S): at 21:53

## 2022-10-12 RX ADMIN — Medication 90 MILLIGRAM(S): at 06:16

## 2022-10-12 RX ADMIN — CHOLESTYRAMINE 4 GRAM(S): 4 POWDER, FOR SUSPENSION ORAL at 09:45

## 2022-10-12 RX ADMIN — HEPARIN SODIUM 5000 UNIT(S): 5000 INJECTION INTRAVENOUS; SUBCUTANEOUS at 17:24

## 2022-10-12 RX ADMIN — Medication 1 TABLET(S): at 05:22

## 2022-10-12 RX ADMIN — Medication 30 MILLILITER(S): at 05:23

## 2022-10-12 RX ADMIN — Medication 650 MILLIGRAM(S): at 09:22

## 2022-10-12 RX ADMIN — HEPARIN SODIUM 5000 UNIT(S): 5000 INJECTION INTRAVENOUS; SUBCUTANEOUS at 05:22

## 2022-10-12 RX ADMIN — Medication 50 MILLIGRAM(S): at 05:22

## 2022-10-12 RX ADMIN — Medication 1 PATCH: at 23:12

## 2022-10-12 RX ADMIN — SODIUM CHLORIDE 63 MILLILITER(S): 9 INJECTION, SOLUTION INTRAVENOUS at 01:35

## 2022-10-12 RX ADMIN — Medication 1 TABLET(S): at 17:23

## 2022-10-12 RX ADMIN — Medication 650 MILLIGRAM(S): at 23:44

## 2022-10-12 RX ADMIN — CHOLESTYRAMINE 4 GRAM(S): 4 POWDER, FOR SUSPENSION ORAL at 21:53

## 2022-10-12 RX ADMIN — Medication 650 MILLIGRAM(S): at 08:33

## 2022-10-12 RX ADMIN — Medication 50 MILLIGRAM(S): at 13:03

## 2022-10-12 NOTE — PROGRESS NOTE ADULT - SUBJECTIVE AND OBJECTIVE BOX
Patient is a 52y old  Female who presents with a chief complaint of diarrhea (12 Oct 2022 10:10)      OVERNIGHT EVENTS:  Patients seen and examined at bedside this morning. No acute events overnight.    REVIEW OF SYSTEMS: denies chest pain/SOB, diaphoresis, no F/C, cough, dizziness, headache, blurry vision, nausea, vomiting, abdominal pain. All others review of systems negative     MEDICATIONS  (STANDING):  cholestyramine Powder (Sugar-Free) 4 Gram(s) Oral two times a day  dextrose 5% + sodium chloride 0.45%. 1000 milliLiter(s) (63 mL/Hr) IV Continuous <Continuous>  dextrose 5% + sodium chloride 0.9%. 1000 milliLiter(s) (125 mL/Hr) IV Continuous <Continuous>  heparin   Injectable 5000 Unit(s) SubCutaneous every 12 hours  hydrALAZINE 50 milliGRAM(s) Oral every 8 hours  lactobacillus acidophilus 1 Tablet(s) Oral two times a day  metoprolol succinate ER 50 milliGRAM(s) Oral daily  nicotine -   7 mG/24Hr(s) Patch 1 Patch Transdermal daily  NIFEdipine XL 90 milliGRAM(s) Oral daily  pantoprazole    Tablet 40 milliGRAM(s) Oral before breakfast    MEDICATIONS  (PRN):  acetaminophen     Tablet .. 650 milliGRAM(s) Oral every 6 hours PRN Temp greater or equal to 38C (100.4F), Mild Pain (1 - 3)  benzocaine 15 mG/menthol 3.6 mG Lozenge 1 Lozenge Oral three times a day PRN Sore Throat      Allergies    No Known Allergies    Intolerances        T(F): 97.7 (10-12-22 @ 11:39), Max: 98.6 (10-12-22 @ 10:56)  HR: 81 (10-12-22 @ 11:39) (77 - 87)  BP: 141/89 (10-12-22 @ 11:39) (141/89 - 168/91)  RR: 17 (10-12-22 @ 11:39) (17 - 18)  SpO2: 99% (10-12-22 @ 11:39) (97% - 99%)  Wt(kg): --    PHYSICAL EXAM:  GENERAL: NAD  HEAD:  Atraumatic, Normocephalic  EYES: PERRLA, conjunctiva and sclera clear  ENMT: Moist mucous membranes  NECK: Supple, No JVD, Normal thyroid  NERVOUS SYSTEM:  Alert & Awake  CHEST/LUNG: Clear to percussion bilaterally;   HEART: Regular rate and rhythm;   ABDOMEN: Soft, Nontender, Nondistended; Bowel sounds present  EXTREMITIES:  no edema BL LE  SKIN: moist    LABS:                        14.8   6.91  )-----------( 237      ( 12 Oct 2022 08:03 )             46.0     10-12    141  |  107  |  6<L>  ----------------------------<  100<H>  3.1<L>   |  26  |  0.66    Ca    9.2      12 Oct 2022 08:03  Phos  3.5     10-11  Mg     2.6     10-12          Cultures;   CAPILLARY BLOOD GLUCOSE        Lipid panel:           RADIOLOGY & ADDITIONAL TESTS:    Imaging Personally Reviewed:  [x ] YES      Consultant(s) Notes Reviewed:  [x ] YES     Care Discussed with [x ] Consultants [X ] Patient [ ] Family  [x ]    [x ]  Other; RN

## 2022-10-12 NOTE — PROGRESS NOTE ADULT - ASSESSMENT
52 year old female pt  states  she has no  pmh/ takes  no meds however,  was  told   to take bp meds, that  she has refused, presents c/o one week h/o intermittent nausea, vomiting and diarrhea lower abd pain. Pt reports eating from outside, including  sea food, chinese  food  and salads, denies recent travel or sick contacts.    acute gastroenteritis  -continues to be afebrile  -no recent antibiotics   -lactic acid- wnl  -CT of abd-reviewed showing ?ileus or enteritis   -GI PCR -shows E.COLI   -stool cultures negative  -antibiotics discontinued, s/p cipro and flagyl since patient was improving  -now nauseas and having watery diarrhea- diet switched to clear liquid diet  -GI consult -Dr. luna   -cholestyramine Powder, pantoprazole  -Pepto Bismo x 3 doses, then D/C    Hypokalemia -continue to supplement  HTN- hydrALAZINE, metoprolol succinate, NIFEdipine  active smoker- nicotine -   7 mG/24Hr(s) Patch 1 Patch Transdermal daily     *  on  dvt ppx/ s/q  heparin, pt also mobile

## 2022-10-12 NOTE — PROGRESS NOTE ADULT - SUBJECTIVE AND OBJECTIVE BOX
Patient is a 52y old  Female who presents with a chief complaint of diarrhea (11 Oct 2022 17:05)      HPI:  52 year old female   pt  states  she has no  pmh/ takes  no meds   however,  was  told   to take bp meds, that  she ha s refused      presents today , c/o one week h/o intermittent nausea, vomiting and diarrhea lower abd pain,.    pt reports   eating  from outside, including  sea food, chinese  food  and salads    denies recent travel or sick contacts  pt  seen in er, ha sno abd  pain  now (08 Oct 2022 12:30)      INTERVAL HPI/OVERNIGHT EVENTS:  No BMs since last seen. No abdominal pain. Tolerating clear liquid diet. Off antibiotics.     MEDICATIONS  (STANDING):  cholestyramine Powder (Sugar-Free) 4 Gram(s) Oral two times a day  dextrose 5% + sodium chloride 0.45%. 1000 milliLiter(s) (63 mL/Hr) IV Continuous <Continuous>  dextrose 5% + sodium chloride 0.9%. 1000 milliLiter(s) (125 mL/Hr) IV Continuous <Continuous>  heparin   Injectable 5000 Unit(s) SubCutaneous every 12 hours  hydrALAZINE 50 milliGRAM(s) Oral every 8 hours  lactobacillus acidophilus 1 Tablet(s) Oral two times a day  metoprolol succinate ER 50 milliGRAM(s) Oral daily  nicotine -   7 mG/24Hr(s) Patch 1 Patch Transdermal daily  NIFEdipine XL 90 milliGRAM(s) Oral daily  pantoprazole    Tablet 40 milliGRAM(s) Oral before breakfast    MEDICATIONS  (PRN):  acetaminophen     Tablet .. 650 milliGRAM(s) Oral every 6 hours PRN Temp greater or equal to 38C (100.4F), Mild Pain (1 - 3)  benzocaine 15 mG/menthol 3.6 mG Lozenge 1 Lozenge Oral three times a day PRN Sore Throat      FAMILY HISTORY:  Family history of hypertension    Family history of diabetes mellitus    Family history of Parkinson&#x27;s disease    Family history of sarcoidosis (Sibling)        Allergies    No Known Allergies    Intolerances        PMH/PSH:  No pertinent past medical history    GERD (gastroesophageal reflux disease)    History of cholecystectomy          REVIEW OF SYSTEMS:  CONSTITUTIONAL: No fever, weight loss,   EYES: No eye pain, visual disturbances, or discharge  ENMT:  No difficulty hearing, tinnitus, vertigo; No sinus or throat pain  NECK: No pain or stiffness  BREASTS: No pain, masses, or nipple discharge  RESPIRATORY: No cough, wheezing, chills or hemoptysis; No shortness of breath  CARDIOVASCULAR: No chest pain, palpitations, dizziness, or leg swelling  GASTROINTESTINAL: See above   GENITOURINARY: No dysuria, frequency, hematuria, or incontinence  NEUROLOGICAL: No headaches, memory loss, loss of strength, numbness, or tremors  SKIN: No itching, burning, rashes, or lesions   LYMPH NODES: No enlarged glands  ENDOCRINE: No heat or cold intolerance; No hair loss  MUSCULOSKELETAL: No joint pain or swelling; No muscle, back, or extremity pain  PSYCHIATRIC: No depression, anxiety, mood swings, or difficulty sleeping  HEME/LYMPH: No easy bruising, or bleeding gums  ALLERGY AND IMMUNOLOGIC: No hives or eczema    Vital Signs Last 24 Hrs  T(C): 36.7 (12 Oct 2022 05:03), Max: 36.8 (11 Oct 2022 11:07)  T(F): 98.1 (12 Oct 2022 05:03), Max: 98.3 (11 Oct 2022 11:07)  HR: 77 (12 Oct 2022 05:03) (77 - 82)  BP: 146/77 (12 Oct 2022 05:03) (146/77 - 168/91)  BP(mean): --  RR: 18 (12 Oct 2022 05:03) (18 - 18)  SpO2: 97% (12 Oct 2022 05:03) (97% - 98%)    Parameters below as of 12 Oct 2022 05:03  Patient On (Oxygen Delivery Method): room air        PHYSICAL EXAM:  GENERAL: NAD, well-groomed, well-developed  HEAD:  Atraumatic, Normocephalic  EYES: EOMI, PERRLA, conjunctiva and sclera clear  NECK: Supple, No JVD, Normal thyroid  NERVOUS SYSTEM:  Alert & Oriented X3, Good concentration; Motor Strength 5/5 B/L upper and lower extremities;  CHEST/LUNG: Clear to percussion bilaterally; No rales, rhonchi, wheezing, or rubs  HEART: Regular rate and rhythm; No murmurs, rubs, or gallops  ABDOMEN: Soft, Nontender, Nondistended; Bowel sounds present  EXTREMITIES:  2+ Peripheral Pulses, No clubbing, cyanosis, or edema  LYMPH: No lymphadenopathy noted  SKIN: No rashes or lesions    LAB                          14.8   6.91  )-----------( 237      ( 12 Oct 2022 08:03 )             46.0       CBC:  10-12 @ 08:03  WBC 6.91   Hgb 14.8   Hct 46.0   Plts 237  MCV 96.4  10-11 @ 14:19  WBC 6.87   Hgb 14.9   Hct 45.2   Plts 236  MCV 95.0  10-09 @ 07:26  WBC 5.98   Hgb 13.4   Hct 41.0   Plts 168  MCV 96.7  10-08 @ 00:30  WBC 7.41   Hgb 15.7   Hct 48.1   Plts 203  MCV 97.2      Chemistry:  10-12 @ 08:03  Na+ 141  K+ 3.1  Cl- 107  CO2 26  BUN 6  Cr 0.66     10-11 @ 14:19  Na+ 141  K+ 3.2  Cl- 106  CO2 25  BUN 7  Cr 0.62     10-09 @ 07:00  Na+ 145  K+ 3.8  Cl- 112  CO2 27  BUN 11  Cr 0.74     10-08 @ 14:07  Na+ 143  K+ 3.3  Cl- 113  CO2 23  BUN 17  Cr 0.85     10-08 @ 00:30  Na+ 143  K+ 2.9  Cl- 107  CO2 27  BUN 22  Cr 1.23         Glucose, Serum: 100 mg/dL (10-12 @ 08:03)  Glucose, Serum: 98 mg/dL (10-11 @ 14:19)  Glucose, Serum: 72 mg/dL (10-09 @ 07:00)  Glucose, Serum: 136 mg/dL (10-08 @ 14:07)  Glucose, Serum: 133 mg/dL (10-08 @ 00:30)      12 Oct 2022 08:03    141    |  107    |  6      ----------------------------<  100    3.1     |  26     |  0.66   11 Oct 2022 14:19    141    |  106    |  7      ----------------------------<  98     3.2     |  25     |  0.62   09 Oct 2022 07:00    145    |  112    |  11     ----------------------------<  72     3.8     |  27     |  0.74   08 Oct 2022 14:07    143    |  113    |  17     ----------------------------<  136    3.3     |  23     |  0.85   08 Oct 2022 00:30    143    |  107    |  22     ----------------------------<  133    2.9     |  27     |  1.23     Ca    9.2        12 Oct 2022 08:03  Ca    9.2        11 Oct 2022 14:19  Ca    8.6        09 Oct 2022 07:00  Ca    8.4        08 Oct 2022 14:07  Ca    9.3        08 Oct 2022 00:30  Phos  3.5       11 Oct 2022 14:19  Mg     2.6       12 Oct 2022 08:03  Mg     2.4       11 Oct 2022 14:19    TPro  7.6    /  Alb  4.0    /  TBili  0.5    /  DBili  x      /  AST  17     /  ALT  20     /  AlkPhos  69     08 Oct 2022 00:30              CAPILLARY BLOOD GLUCOSE              RADIOLOGY & ADDITIONAL TESTS:    Imaging Personally Reviewed:  [ ] YES  [ ] NO    Consultant(s) Notes Reviewed:  [ ] YES  [ ] NO    Care Discussed with Consultants/Other Providers [ ] YES  [ ] NO

## 2022-10-12 NOTE — PROGRESS NOTE ADULT - ASSESSMENT
HPI:  52 year old female   pt  states  she has no  pmh/ takes  no meds   however,  was  told   to take bp meds, that  she ha s refused      presents today , c/o one week h/o intermittent nausea, vomiting and diarrhea lower abd pain,.    pt reports   eating  from outside, including  sea food, chinese  food  and salads    denies recent travel or sick contacts  pt  seen in er, ha snuzair abd  pain  now (08 Oct 2022 12:30)  ---- As Above -------  The patient was in her USOH until last week when she had one episode of diarrhea. it temporarily resolved but it got worse Wednesday. Since then, she has been having worsening diarrhea. She had been given PO antibiotics.   Since arrival, the diarrhea had been getting better until yesterday. She had been put on a solid diet and the diarrhea worsened. Today, she had one watery BM after breakfast ( Was subsequently placed back on a liquid diet ) Since then, the diarrhea has resolved. She is not on any antibiotics. patient given one dose of Pepto Bismol   Stool PCR (+) for E Coli  No prior history of any GI disease. Never had a colonoscopy.     Diarrhea, E Coli by PCR, was getting better - 1) Advance to full  liquid diet 2) Hold antibiotics for now 3) Pepto Bismo x 3 doses, then D/C  4) Decrease IV fluids to 42 ml / hr 5) Flex sig if worsens  6) Check  CRP 7) stool count HPI:  52 year old female   pt  states  she has no  pmh/ takes  no meds   however,  was  told   to take bp meds, that  she ha s refused      presents today , c/o one week h/o intermittent nausea, vomiting and diarrhea lower abd pain,.    pt reports   eating  from outside, including  sea food, chinese  food  and salads    denies recent travel or sick contacts  pt  seen in er, ha snuzair abd  pain  now (08 Oct 2022 12:30)  ---- As Above -------  The patient was in her USOH until last week when she had one episode of diarrhea. it temporarily resolved but it got worse Wednesday. Since then, she has been having worsening diarrhea. She had been given PO antibiotics.   Since arrival, the diarrhea had been getting better until yesterday. She had been put on a solid diet and the diarrhea worsened. Today, she had one watery BM after breakfast ( Was subsequently placed back on a liquid diet ) Since then, the diarrhea has resolved. She is not on any antibiotics. patient given one dose of Pepto Bismol   Stool PCR (+) for E Coli  No prior history of any GI disease. Never had a colonoscopy.     Diarrhea, E Coli by PCR, was getting better - Now asymptomatic. WBC unchanged.  - 1) Advance to full liquid diet 2) Hold antibiotics for now 3) Pepto Bismo x 3 doses, then D/C  4) Decrease IV fluids to 42 ml / hr 5) Flex sig if worsens  6) Check  CRP 7) stool count

## 2022-10-13 ENCOUNTER — TRANSCRIPTION ENCOUNTER (OUTPATIENT)
Age: 52
End: 2022-10-13

## 2022-10-13 VITALS
HEART RATE: 88 BPM | TEMPERATURE: 98 F | RESPIRATION RATE: 17 BRPM | SYSTOLIC BLOOD PRESSURE: 155 MMHG | OXYGEN SATURATION: 98 % | DIASTOLIC BLOOD PRESSURE: 79 MMHG

## 2022-10-13 LAB
ANION GAP SERPL CALC-SCNC: 7 MMOL/L — SIGNIFICANT CHANGE UP (ref 5–17)
BUN SERPL-MCNC: 8 MG/DL — SIGNIFICANT CHANGE UP (ref 7–23)
CALCIUM SERPL-MCNC: 9.3 MG/DL — SIGNIFICANT CHANGE UP (ref 8.5–10.1)
CHLORIDE SERPL-SCNC: 109 MMOL/L — HIGH (ref 96–108)
CO2 SERPL-SCNC: 25 MMOL/L — SIGNIFICANT CHANGE UP (ref 22–31)
CREAT SERPL-MCNC: 0.76 MG/DL — SIGNIFICANT CHANGE UP (ref 0.5–1.3)
CULTURE RESULTS: SIGNIFICANT CHANGE UP
CULTURE RESULTS: SIGNIFICANT CHANGE UP
EGFR: 95 ML/MIN/1.73M2 — SIGNIFICANT CHANGE UP
GLUCOSE SERPL-MCNC: 102 MG/DL — HIGH (ref 70–99)
MAGNESIUM SERPL-MCNC: 2.5 MG/DL — SIGNIFICANT CHANGE UP (ref 1.6–2.6)
PHOSPHATE SERPL-MCNC: 2.8 MG/DL — SIGNIFICANT CHANGE UP (ref 2.5–4.5)
POTASSIUM SERPL-MCNC: 3.3 MMOL/L — LOW (ref 3.5–5.3)
POTASSIUM SERPL-SCNC: 3.3 MMOL/L — LOW (ref 3.5–5.3)
SODIUM SERPL-SCNC: 141 MMOL/L — SIGNIFICANT CHANGE UP (ref 135–145)
SPECIMEN SOURCE: SIGNIFICANT CHANGE UP
SPECIMEN SOURCE: SIGNIFICANT CHANGE UP

## 2022-10-13 PROCEDURE — 99239 HOSP IP/OBS DSCHRG MGMT >30: CPT

## 2022-10-13 RX ORDER — CHOLESTYRAMINE 4 G/9G
4 POWDER, FOR SUSPENSION ORAL
Qty: 240 | Refills: 0
Start: 2022-10-13 | End: 2022-11-11

## 2022-10-13 RX ORDER — DIPHENHYDRAMINE HCL 50 MG
25 CAPSULE ORAL ONCE
Refills: 0 | Status: COMPLETED | OUTPATIENT
Start: 2022-10-13 | End: 2022-10-13

## 2022-10-13 RX ORDER — NICOTINE POLACRILEX 2 MG
1 GUM BUCCAL
Qty: 30 | Refills: 0
Start: 2022-10-13 | End: 2022-11-11

## 2022-10-13 RX ORDER — METOPROLOL TARTRATE 50 MG
1 TABLET ORAL
Qty: 30 | Refills: 0
Start: 2022-10-13 | End: 2022-11-11

## 2022-10-13 RX ORDER — HYDRALAZINE HCL 50 MG
1 TABLET ORAL
Qty: 90 | Refills: 0
Start: 2022-10-13 | End: 2022-11-11

## 2022-10-13 RX ORDER — NIFEDIPINE 30 MG
1 TABLET, EXTENDED RELEASE 24 HR ORAL
Qty: 30 | Refills: 0
Start: 2022-10-13 | End: 2022-11-11

## 2022-10-13 RX ADMIN — Medication 50 MILLIGRAM(S): at 05:34

## 2022-10-13 RX ADMIN — Medication 1 PATCH: at 11:43

## 2022-10-13 RX ADMIN — Medication 650 MILLIGRAM(S): at 00:47

## 2022-10-13 RX ADMIN — Medication 50 MILLIGRAM(S): at 05:33

## 2022-10-13 RX ADMIN — Medication 1 TABLET(S): at 05:33

## 2022-10-13 RX ADMIN — Medication 90 MILLIGRAM(S): at 05:33

## 2022-10-13 RX ADMIN — Medication 25 MILLIGRAM(S): at 01:21

## 2022-10-13 RX ADMIN — Medication 1 PATCH: at 11:41

## 2022-10-13 RX ADMIN — PANTOPRAZOLE SODIUM 40 MILLIGRAM(S): 20 TABLET, DELAYED RELEASE ORAL at 07:35

## 2022-10-13 RX ADMIN — CHOLESTYRAMINE 4 GRAM(S): 4 POWDER, FOR SUSPENSION ORAL at 09:11

## 2022-10-13 RX ADMIN — Medication 1 PATCH: at 07:33

## 2022-10-13 RX ADMIN — Medication 650 MILLIGRAM(S): at 08:40

## 2022-10-13 RX ADMIN — Medication 650 MILLIGRAM(S): at 07:47

## 2022-10-13 NOTE — PROGRESS NOTE ADULT - ASSESSMENT
HPI:  52 year old female   pt  states  she has no  pmh/ takes  no meds   however,  was  told   to take bp meds, that  she ha s refused      presents today , c/o one week h/o intermittent nausea, vomiting and diarrhea lower abd pain,.    pt reports   eating  from outside, including  sea food, chinese  food  and salads    denies recent travel or sick contacts  pt  seen in er, ha snuzair abd  pain  now (08 Oct 2022 12:30)  ---- As Above -------  The patient was in her USOH until last week when she had one episode of diarrhea. it temporarily resolved but it got worse Wednesday. Since then, she has been having worsening diarrhea. She had been given PO antibiotics.   Since arrival, the diarrhea had been getting better until yesterday. She had been put on a solid diet and the diarrhea worsened. Today, she had one watery BM after breakfast ( Was subsequently placed back on a liquid diet ) Since then, the diarrhea has resolved. She is not on any antibiotics. patient given one dose of Pepto Bismol   Stool PCR (+) for E Coli  No prior history of any GI disease. Never had a colonoscopy.     Diarrhea, E Coli by PCR, was getting better - Now asymptomatic. WBC unchanged.  - 1) Advance to regular ( lactose free / low fiber ) diet 2) Hold antibiotics for now 3) Hold IV fluids   === stable for discharge as long as patient tolerates regular diet

## 2022-10-13 NOTE — DISCHARGE NOTE PROVIDER - NSDCCPCAREPLAN_GEN_ALL_CORE_FT
PRINCIPAL DISCHARGE DIAGNOSIS  Diagnosis: Acute gastroenteritis  Assessment and Plan of Treatment: -continues to be afebrile  -no recent antibiotics   -lactic acid- wnl  -CT of abd-reviewed showing ?ileus or enteritis   -GI PCR -shows E.COLI   -stool cultures negative  -antibiotics discontinued, s/p cipro and flagyl since patient was improving  -now nauseas and having watery diarrhea- diet switched to clear liquid diet  -GI consult -Dr. luna   -cholestyramine Powder, pantoprazole      SECONDARY DISCHARGE DIAGNOSES  Diagnosis: Hypokalemia  Assessment and Plan of Treatment:     Diagnosis: Tobacco dependence  Assessment and Plan of Treatment: active smoker- nicotine -   7 mG/24Hr(s) Patch 1 Patch Transdermal daily

## 2022-10-13 NOTE — DISCHARGE NOTE NURSING/CASE MANAGEMENT/SOCIAL WORK - PATIENT PORTAL LINK FT
You can access the FollowMyHealth Patient Portal offered by [facility] by registering at the following website: [email or web address]. By joining [email or web address]’s FollowMyHealth portal, you will also be able to view your health information using other applications (apps) compatible with our system.

## 2022-10-13 NOTE — PROGRESS NOTE ADULT - SUBJECTIVE AND OBJECTIVE BOX
Patient is a 52y old  Female who presents with a chief complaint of diarrhea (12 Oct 2022 11:47)      HPI:  52 year old female   pt  states  she has no  pmh/ takes  no meds   however,  was  told   to take bp meds, that  she ha s refused      presents today , c/o one week h/o intermittent nausea, vomiting and diarrhea lower abd pain,.    pt reports   eating  from outside, including  sea food, chinese  food  and salads    denies recent travel or sick contacts  pt  seen in er, ha sno abd  pain  now (08 Oct 2022 12:30)      INTERVAL HPI/OVERNIGHT EVENTS: patient seen earlier today  The patient denies melena, hematochezia, hematemesis, nausea, vomiting, abdominal pain, constipation, diarrhea, or change in bowel movements Tolerating full liquid diet    MEDICATIONS  (STANDING):  cholestyramine Powder (Sugar-Free) 4 Gram(s) Oral two times a day  dextrose 5% + sodium chloride 0.45%. 1000 milliLiter(s) (63 mL/Hr) IV Continuous <Continuous>  dextrose 5% + sodium chloride 0.9%. 1000 milliLiter(s) (125 mL/Hr) IV Continuous <Continuous>  heparin   Injectable 5000 Unit(s) SubCutaneous every 12 hours  hydrALAZINE 50 milliGRAM(s) Oral every 8 hours  lactobacillus acidophilus 1 Tablet(s) Oral two times a day  metoprolol succinate ER 50 milliGRAM(s) Oral daily  nicotine -   7 mG/24Hr(s) Patch 1 Patch Transdermal daily  NIFEdipine XL 90 milliGRAM(s) Oral daily  pantoprazole    Tablet 40 milliGRAM(s) Oral before breakfast    MEDICATIONS  (PRN):  acetaminophen     Tablet .. 650 milliGRAM(s) Oral every 6 hours PRN Temp greater or equal to 38C (100.4F), Mild Pain (1 - 3)  benzocaine 15 mG/menthol 3.6 mG Lozenge 1 Lozenge Oral three times a day PRN Sore Throat      FAMILY HISTORY:  Family history of hypertension    Family history of diabetes mellitus    Family history of Parkinson&#x27;s disease    Family history of sarcoidosis (Sibling)        Allergies    No Known Allergies    Intolerances        PMH/PSH:  No pertinent past medical history    GERD (gastroesophageal reflux disease)    History of cholecystectomy          REVIEW OF SYSTEMS:  CONSTITUTIONAL: No fever, weight loss,   EYES: No eye pain, visual disturbances, or discharge  ENMT:  No difficulty hearing, tinnitus, vertigo; No sinus or throat pain  NECK: No pain or stiffness  BREASTS: No pain, masses, or nipple discharge  RESPIRATORY: No cough, wheezing, chills or hemoptysis; No shortness of breath  CARDIOVASCULAR: No chest pain, palpitations, dizziness, or leg swelling  GASTROINTESTINAL: See above   GENITOURINARY: No dysuria, frequency, hematuria, or incontinence  NEUROLOGICAL: No headaches, memory loss, loss of strength, numbness, or tremors  SKIN: No itching, burning, rashes, or lesions   LYMPH NODES: No enlarged glands  ENDOCRINE: No heat or cold intolerance; No hair loss  MUSCULOSKELETAL: No joint pain or swelling; No muscle, back, or extremity pain  PSYCHIATRIC: No depression, anxiety, mood swings, or difficulty sleeping  HEME/LYMPH: No easy bruising, or bleeding gums  ALLERGY AND IMMUNOLOGIC: No hives or eczema    Vital Signs Last 24 Hrs  T(C): 36.8 (13 Oct 2022 10:49), Max: 36.8 (13 Oct 2022 10:49)  T(F): 98.3 (13 Oct 2022 10:49), Max: 98.3 (13 Oct 2022 10:49)  HR: 88 (13 Oct 2022 10:49) (73 - 88)  BP: 155/79 (13 Oct 2022 10:49) (147/79 - 163/86)  BP(mean): --  RR: 17 (13 Oct 2022 10:49) (17 - 18)  SpO2: 98% (13 Oct 2022 10:49) (98% - 99%)    Parameters below as of 13 Oct 2022 10:49  Patient On (Oxygen Delivery Method): room air        PHYSICAL EXAM:  GENERAL: NAD, well-groomed, well-developed  HEAD:  Atraumatic, Normocephalic  EYES: EOMI, PERRLA, conjunctiva and sclera clear  ENMT: No tonsillar erythema, exudates, or enlargement; Moist mucous membranes, Good dentition, No lesions  NECK: Supple, No JVD, Normal thyroid  NERVOUS SYSTEM:  Alert & Oriented X3, Good concentration; Motor Strength 5/5 B/L upper and lower extremities;   CHEST/LUNG: Clear to percussion bilaterally; No rales, rhonchi, wheezing, or rubs  HEART: Regular rate and rhythm; No murmurs, rubs, or gallops  ABDOMEN: Soft, Nontender, Nondistended; Bowel sounds present  EXTREMITIES:  2+ Peripheral Pulses, No clubbing, cyanosis, or edema  LYMPH: No lymphadenopathy noted  SKIN: No rashes or lesions    LAB                          14.8   6.91  )-----------( 237      ( 12 Oct 2022 08:03 )             46.0       CBC:  10-12 @ 08:03  WBC 6.91   Hgb 14.8   Hct 46.0   Plts 237  MCV 96.4  10-11 @ 14:19  WBC 6.87   Hgb 14.9   Hct 45.2   Plts 236  MCV 95.0  10-09 @ 07:26  WBC 5.98   Hgb 13.4   Hct 41.0   Plts 168  MCV 96.7  10-08 @ 00:30  WBC 7.41   Hgb 15.7   Hct 48.1   Plts 203  MCV 97.2      Chemistry:  10-13 @ 07:02  Na+ 141  K+ 3.3  Cl- 109  CO2 25  BUN 8  Cr 0.76     10-12 @ 08:03  Na+ 141  K+ 3.1  Cl- 107  CO2 26  BUN 6  Cr 0.66     10-11 @ 14:19  Na+ 141  K+ 3.2  Cl- 106  CO2 25  BUN 7  Cr 0.62     10-09 @ 07:00  Na+ 145  K+ 3.8  Cl- 112  CO2 27  BUN 11  Cr 0.74     10-08 @ 14:07  Na+ 143  K+ 3.3  Cl- 113  CO2 23  BUN 17  Cr 0.85     10-08 @ 00:30  Na+ 143  K+ 2.9  Cl- 107  CO2 27  BUN 22  Cr 1.23         Glucose, Serum: 102 mg/dL (10-13 @ 07:02)  Glucose, Serum: 100 mg/dL (10-12 @ 08:03)  Glucose, Serum: 98 mg/dL (10-11 @ 14:19)  Glucose, Serum: 72 mg/dL (10-09 @ 07:00)  Glucose, Serum: 136 mg/dL (10-08 @ 14:07)  Glucose, Serum: 133 mg/dL (10-08 @ 00:30)      13 Oct 2022 07:02    141    |  109    |  8      ----------------------------<  102    3.3     |  25     |  0.76   12 Oct 2022 08:03    141    |  107    |  6      ----------------------------<  100    3.1     |  26     |  0.66   11 Oct 2022 14:19    141    |  106    |  7      ----------------------------<  98     3.2     |  25     |  0.62   09 Oct 2022 07:00    145    |  112    |  11     ----------------------------<  72     3.8     |  27     |  0.74   08 Oct 2022 14:07    143    |  113    |  17     ----------------------------<  136    3.3     |  23     |  0.85   08 Oct 2022 00:30    143    |  107    |  22     ----------------------------<  133    2.9     |  27     |  1.23     Ca    9.3        13 Oct 2022 07:02  Ca    9.2        12 Oct 2022 08:03  Ca    9.2        11 Oct 2022 14:19  Ca    8.6        09 Oct 2022 07:00  Ca    8.4        08 Oct 2022 14:07  Ca    9.3        08 Oct 2022 00:30  Phos  2.8       13 Oct 2022 07:02  Phos  3.5       11 Oct 2022 14:19  Mg     2.5       13 Oct 2022 07:02  Mg     2.6       12 Oct 2022 08:03  Mg     2.4       11 Oct 2022 14:19    TPro  7.6    /  Alb  4.0    /  TBili  0.5    /  DBili  x      /  AST  17     /  ALT  20     /  AlkPhos  69     08 Oct 2022 00:30              CAPILLARY BLOOD GLUCOSE              RADIOLOGY & ADDITIONAL TESTS:    Imaging Personally Reviewed:  [ ] YES  [ ] NO    Consultant(s) Notes Reviewed:  [ ] YES  [ ] NO    Care Discussed with Consultants/Other Providers [ ] YES  [ ] NO

## 2022-10-13 NOTE — CHART NOTE - NSCHARTNOTEFT_GEN_A_CORE
Date October 13, 2022    [place] [address]      To Whom It May Concern,     Ms Delphine Jett  was admitted on  10/08/22, treated and discharged on 10/13/22 from [facility].    If any questions or concerns please call.     Thanks     Lanny Leavitt NP-C  for   Dr Lo Arora  Hospitalist  [phone]

## 2022-10-13 NOTE — DISCHARGE NOTE PROVIDER - NSDCMRMEDTOKEN_GEN_ALL_CORE_FT
cholestyramine 4 g/9 g oral powder for reconstitution: 4 gram(s) orally 2 times a day   hydrALAZINE 50 mg oral tablet: 1 tab(s) orally every 8 hours  metoprolol succinate 50 mg oral tablet, extended release: 1 tab(s) orally once a day  nicotine 7 mg/24 hr transdermal film, extended release: 1 patch transdermal once a day   NIFEdipine 90 mg oral tablet, extended release: 1 tab(s) orally once a day  pantoprazole 40 mg oral delayed release tablet: 1 tab(s) orally once a day (before breakfast)

## 2022-10-13 NOTE — DISCHARGE NOTE PROVIDER - HOSPITAL COURSE
52 year old female pt  states  she has no  pmh/ takes  no meds however,  was  told   to take bp meds, that  she has refused, presents c/o one week h/o intermittent nausea, vomiting and diarrhea lower abd pain. Pt reports eating from outside, including  sea food, chinese  food  and salads, denies recent travel or sick contacts.    acute gastroenteritis  -continues to be afebrile  -no recent antibiotics   -lactic acid- wnl  -CT of abd-reviewed showing ?ileus or enteritis   -GI PCR -shows E.COLI   -stool cultures negative  -antibiotics discontinued, s/p cipro and flagyl since patient was improving  -now nauseas and having watery diarrhea- diet switched to clear liquid diet  -GI consult -Dr. luna   -cholestyramine Powder, pantoprazole  -Pepto Bismo x 3 doses, then D/C    Hypokalemia -continue to supplement  HTN- hydrALAZINE, metoprolol succinate, NIFEdipine  active smoker- nicotine -   7 mG/24Hr(s) Patch 1 Patch Transdermal daily    PHYSICAL EXAM:  GENERAL: NAD  HEAD:  Atraumatic, Normocephalic  EYES: PERRLA, conjunctiva and sclera clear  ENMT: Moist mucous membranes  NECK: Supple, No JVD, Normal thyroid  NERVOUS SYSTEM:  Alert & Awake  CHEST/LUNG: Clear to percussion bilaterally;   HEART: Regular rate and rhythm;   ABDOMEN: Soft, Nontender, Nondistended; Bowel sounds present  EXTREMITIES:  no edema BL LE  SKIN: moist

## 2022-10-18 DIAGNOSIS — F17.200 NICOTINE DEPENDENCE, UNSPECIFIED, UNCOMPLICATED: ICD-10-CM

## 2022-10-18 DIAGNOSIS — A04.4 OTHER INTESTINAL ESCHERICHIA COLI INFECTIONS: ICD-10-CM

## 2022-10-18 DIAGNOSIS — K21.9 GASTRO-ESOPHAGEAL REFLUX DISEASE WITHOUT ESOPHAGITIS: ICD-10-CM

## 2022-10-18 DIAGNOSIS — E87.6 HYPOKALEMIA: ICD-10-CM

## 2022-10-18 DIAGNOSIS — I10 ESSENTIAL (PRIMARY) HYPERTENSION: ICD-10-CM

## 2022-10-18 DIAGNOSIS — Z90.49 ACQUIRED ABSENCE OF OTHER SPECIFIED PARTS OF DIGESTIVE TRACT: ICD-10-CM

## 2022-10-18 DIAGNOSIS — Z20.822 CONTACT WITH AND (SUSPECTED) EXPOSURE TO COVID-19: ICD-10-CM

## 2023-05-12 NOTE — H&P ADULT. - NS NEC GEN PE MLT EXAM PC
Received call from pharmacy. Symbicort is not covered by insurance. Received verbal order from Dr. East for Advair 500/50 BID. Sent to Pharmacy.    Ankit Joshi,NATHANN, RN     detailed exam

## 2023-08-24 ENCOUNTER — EMERGENCY (EMERGENCY)
Facility: HOSPITAL | Age: 53
LOS: 0 days | Discharge: HOME | End: 2023-08-24
Attending: STUDENT IN AN ORGANIZED HEALTH CARE EDUCATION/TRAINING PROGRAM
Payer: COMMERCIAL

## 2023-08-24 VITALS
SYSTOLIC BLOOD PRESSURE: 143 MMHG | HEART RATE: 81 BPM | DIASTOLIC BLOOD PRESSURE: 91 MMHG | HEIGHT: 63 IN | RESPIRATION RATE: 18 BRPM | WEIGHT: 220.02 LBS | TEMPERATURE: 99 F | OXYGEN SATURATION: 95 %

## 2023-08-24 VITALS
TEMPERATURE: 98 F | DIASTOLIC BLOOD PRESSURE: 91 MMHG | RESPIRATION RATE: 20 BRPM | HEART RATE: 80 BPM | SYSTOLIC BLOOD PRESSURE: 167 MMHG | OXYGEN SATURATION: 100 %

## 2023-08-24 DIAGNOSIS — K21.9 GASTRO-ESOPHAGEAL REFLUX DISEASE WITHOUT ESOPHAGITIS: ICD-10-CM

## 2023-08-24 DIAGNOSIS — Z90.49 ACQUIRED ABSENCE OF OTHER SPECIFIED PARTS OF DIGESTIVE TRACT: Chronic | ICD-10-CM

## 2023-08-24 DIAGNOSIS — I10 ESSENTIAL (PRIMARY) HYPERTENSION: ICD-10-CM

## 2023-08-24 DIAGNOSIS — R10.84 GENERALIZED ABDOMINAL PAIN: ICD-10-CM

## 2023-08-24 DIAGNOSIS — Z91.048 OTHER NONMEDICINAL SUBSTANCE ALLERGY STATUS: ICD-10-CM

## 2023-08-24 DIAGNOSIS — Z90.49 ACQUIRED ABSENCE OF OTHER SPECIFIED PARTS OF DIGESTIVE TRACT: ICD-10-CM

## 2023-08-24 DIAGNOSIS — F17.210 NICOTINE DEPENDENCE, CIGARETTES, UNCOMPLICATED: ICD-10-CM

## 2023-08-24 DIAGNOSIS — K92.1 MELENA: ICD-10-CM

## 2023-08-24 DIAGNOSIS — R11.2 NAUSEA WITH VOMITING, UNSPECIFIED: ICD-10-CM

## 2023-08-24 LAB
ALBUMIN SERPL ELPH-MCNC: 4.2 G/DL — SIGNIFICANT CHANGE UP (ref 3.3–5)
ALP SERPL-CCNC: 77 U/L — SIGNIFICANT CHANGE UP (ref 40–120)
ALT FLD-CCNC: 25 U/L — SIGNIFICANT CHANGE UP (ref 12–78)
ANION GAP SERPL CALC-SCNC: 3 MMOL/L — LOW (ref 5–17)
APTT BLD: 35.9 SEC — HIGH (ref 24.5–35.6)
AST SERPL-CCNC: 15 U/L — SIGNIFICANT CHANGE UP (ref 15–37)
BASOPHILS # BLD AUTO: 0.03 K/UL — SIGNIFICANT CHANGE UP (ref 0–0.2)
BASOPHILS NFR BLD AUTO: 0.4 % — SIGNIFICANT CHANGE UP (ref 0–2)
BILIRUB SERPL-MCNC: 0.3 MG/DL — SIGNIFICANT CHANGE UP (ref 0.2–1.2)
BLD GP AB SCN SERPL QL: SIGNIFICANT CHANGE UP
BUN SERPL-MCNC: 10 MG/DL — SIGNIFICANT CHANGE UP (ref 7–23)
CALCIUM SERPL-MCNC: 9.3 MG/DL — SIGNIFICANT CHANGE UP (ref 8.5–10.1)
CHLORIDE SERPL-SCNC: 110 MMOL/L — HIGH (ref 96–108)
CO2 SERPL-SCNC: 30 MMOL/L — SIGNIFICANT CHANGE UP (ref 22–31)
CREAT SERPL-MCNC: 0.93 MG/DL — SIGNIFICANT CHANGE UP (ref 0.5–1.3)
EGFR: 74 ML/MIN/1.73M2 — SIGNIFICANT CHANGE UP
EOSINOPHIL # BLD AUTO: 0.15 K/UL — SIGNIFICANT CHANGE UP (ref 0–0.5)
EOSINOPHIL NFR BLD AUTO: 2 % — SIGNIFICANT CHANGE UP (ref 0–6)
GLUCOSE SERPL-MCNC: 92 MG/DL — SIGNIFICANT CHANGE UP (ref 70–99)
HCT VFR BLD CALC: 41.8 % — SIGNIFICANT CHANGE UP (ref 34.5–45)
HCT VFR BLD CALC: 42.6 % — SIGNIFICANT CHANGE UP (ref 34.5–45)
HGB BLD-MCNC: 13.4 G/DL — SIGNIFICANT CHANGE UP (ref 11.5–15.5)
HGB BLD-MCNC: 13.5 G/DL — SIGNIFICANT CHANGE UP (ref 11.5–15.5)
IMM GRANULOCYTES NFR BLD AUTO: 0.1 % — SIGNIFICANT CHANGE UP (ref 0–0.9)
INR BLD: 0.95 RATIO — SIGNIFICANT CHANGE UP (ref 0.85–1.18)
LIDOCAIN IGE QN: 18 U/L — SIGNIFICANT CHANGE UP (ref 13–75)
LYMPHOCYTES # BLD AUTO: 2.41 K/UL — SIGNIFICANT CHANGE UP (ref 1–3.3)
LYMPHOCYTES # BLD AUTO: 32.7 % — SIGNIFICANT CHANGE UP (ref 13–44)
MCHC RBC-ENTMCNC: 31.4 PG — SIGNIFICANT CHANGE UP (ref 27–34)
MCHC RBC-ENTMCNC: 31.7 G/DL — LOW (ref 32–36)
MCHC RBC-ENTMCNC: 31.8 PG — SIGNIFICANT CHANGE UP (ref 27–34)
MCHC RBC-ENTMCNC: 32.1 G/DL — SIGNIFICANT CHANGE UP (ref 32–36)
MCV RBC AUTO: 99.1 FL — SIGNIFICANT CHANGE UP (ref 80–100)
MCV RBC AUTO: 99.1 FL — SIGNIFICANT CHANGE UP (ref 80–100)
MONOCYTES # BLD AUTO: 0.65 K/UL — SIGNIFICANT CHANGE UP (ref 0–0.9)
MONOCYTES NFR BLD AUTO: 8.8 % — SIGNIFICANT CHANGE UP (ref 2–14)
NEUTROPHILS # BLD AUTO: 4.12 K/UL — SIGNIFICANT CHANGE UP (ref 1.8–7.4)
NEUTROPHILS NFR BLD AUTO: 56 % — SIGNIFICANT CHANGE UP (ref 43–77)
NRBC # BLD: 0 /100 WBCS — SIGNIFICANT CHANGE UP (ref 0–0)
NRBC # BLD: 0 /100 WBCS — SIGNIFICANT CHANGE UP (ref 0–0)
PLATELET # BLD AUTO: 196 K/UL — SIGNIFICANT CHANGE UP (ref 150–400)
PLATELET # BLD AUTO: 205 K/UL — SIGNIFICANT CHANGE UP (ref 150–400)
POTASSIUM SERPL-MCNC: 3 MMOL/L — LOW (ref 3.5–5.3)
POTASSIUM SERPL-SCNC: 3 MMOL/L — LOW (ref 3.5–5.3)
PROT SERPL-MCNC: 8.2 GM/DL — SIGNIFICANT CHANGE UP (ref 6–8.3)
PROTHROM AB SERPL-ACNC: 11.3 SEC — SIGNIFICANT CHANGE UP (ref 9.5–13)
RBC # BLD: 4.22 M/UL — SIGNIFICANT CHANGE UP (ref 3.8–5.2)
RBC # BLD: 4.3 M/UL — SIGNIFICANT CHANGE UP (ref 3.8–5.2)
RBC # FLD: 13.9 % — SIGNIFICANT CHANGE UP (ref 10.3–14.5)
RBC # FLD: 13.9 % — SIGNIFICANT CHANGE UP (ref 10.3–14.5)
SODIUM SERPL-SCNC: 143 MMOL/L — SIGNIFICANT CHANGE UP (ref 135–145)
WBC # BLD: 6.77 K/UL — SIGNIFICANT CHANGE UP (ref 3.8–10.5)
WBC # BLD: 7.37 K/UL — SIGNIFICANT CHANGE UP (ref 3.8–10.5)
WBC # FLD AUTO: 6.77 K/UL — SIGNIFICANT CHANGE UP (ref 3.8–10.5)
WBC # FLD AUTO: 7.37 K/UL — SIGNIFICANT CHANGE UP (ref 3.8–10.5)

## 2023-08-24 PROCEDURE — 74177 CT ABD & PELVIS W/CONTRAST: CPT | Mod: 26,MA

## 2023-08-24 PROCEDURE — 99285 EMERGENCY DEPT VISIT HI MDM: CPT

## 2023-08-24 RX ORDER — FAMOTIDINE 10 MG/ML
20 INJECTION INTRAVENOUS ONCE
Refills: 0 | Status: COMPLETED | OUTPATIENT
Start: 2023-08-24 | End: 2023-08-24

## 2023-08-24 RX ORDER — SODIUM CHLORIDE 9 MG/ML
1000 INJECTION INTRAMUSCULAR; INTRAVENOUS; SUBCUTANEOUS ONCE
Refills: 0 | Status: COMPLETED | OUTPATIENT
Start: 2023-08-24 | End: 2023-08-24

## 2023-08-24 RX ORDER — POTASSIUM CHLORIDE 20 MEQ
40 PACKET (EA) ORAL ONCE
Refills: 0 | Status: COMPLETED | OUTPATIENT
Start: 2023-08-24 | End: 2023-08-24

## 2023-08-24 RX ORDER — ONDANSETRON 8 MG/1
4 TABLET, FILM COATED ORAL ONCE
Refills: 0 | Status: COMPLETED | OUTPATIENT
Start: 2023-08-24 | End: 2023-08-24

## 2023-08-24 RX ADMIN — ONDANSETRON 4 MILLIGRAM(S): 8 TABLET, FILM COATED ORAL at 13:18

## 2023-08-24 RX ADMIN — FAMOTIDINE 20 MILLIGRAM(S): 10 INJECTION INTRAVENOUS at 13:17

## 2023-08-24 RX ADMIN — SODIUM CHLORIDE 1000 MILLILITER(S): 9 INJECTION INTRAMUSCULAR; INTRAVENOUS; SUBCUTANEOUS at 13:16

## 2023-08-24 RX ADMIN — Medication 40 MILLIEQUIVALENT(S): at 14:33

## 2023-08-24 NOTE — ED PROVIDER NOTE - NS ED ROS FT
CONSTITUTIONAL: No fever, no chills, no fatigue  EYES: No visual changes  ENT: No ear pain, no sore throat  CARDIOVASCULAR: No chest pain, no palpitations  RESPIRATORY: No cough, no SOB  GI: no constipation.   GENITOURINARY: No dysuria, no frequency, no hematuria  MUSKULOSKELETAL: No backpain, no joint pain.  SKIN: No rash  NEURO: No headache    ALL OTHER SYSTEMS NEGATIVE.

## 2023-08-24 NOTE — ED ADULT NURSE NOTE - NSFALLUNIVINTERV_ED_ALL_ED
You may receive a survey regarding the care you received during your visit. Your input is valuable to us. We encourage you to complete and return your survey. We hope you will choose us in the future for your healthcare needs. GENERAL OFFICE POLICIES      Telephone Calls: Messages will be answered within 1-2 business days, unless the provider is out of the office. If it is urgent a covering provider will answer. (this does not include Medication refills). MyChart: We recommend all patients sign up for [facility]. Through this portal you can see your lab results, request refills, schedule appointments, pay your bill and send messages to the office. [facility] messages will be answered within 1-2 business days unless the provider is out of the office. For urgent matters, please call the office. Appointments:  All appointments must be scheduled. We ask all patients to schedule their next follow up appointment before they leave the office to make sure you will be able to be seen before you run out of medications. 24 hours notice is required to cancel or reschedule an appointment to avoid being marked as a no show. You may be dismissed from the practice after 3 no shows. LATE for Appointment: If you are 15 or more minutes late for your appointment, you may be asked to reschedule. MA/LAB APPTS: Must be scheduled, cannot accept walk in lab visits. We only draw labs for patients established in our office. We only do injections for medications ordered by our office. Acute Sick Visits:  Nothing other than acute complaint will be addressed at this visit. TRADITIONAL MEDICARE  DOES NOT COVER PHYSICALS  MEDICARE WELLNESS VISITS: These are NOT physicals but the free annual visit offered by Medicare to discuss wellness issues. Medication refills, checkups, etc. will not be addressed during this visit.   Medication Refills: Refills are handled electronically so please contact your pharmacy for medication
Bed/Stretcher in lowest position, wheels locked, appropriate side rails in place/Call bell, personal items and telephone in reach/Instruct patient to call for assistance before getting out of bed/chair/stretcher/Non-slip footwear applied when patient is off stretcher/[place] to call system/Physically safe environment - no spills, clutter or unnecessary equipment/Purposeful proactive rounding/Room/bathroom lighting operational, light cord in reach

## 2023-08-24 NOTE — ED PROVIDER NOTE - CARE PLAN
1 Principal Discharge DX:	Abdominal pain   Principal Discharge DX:	Abdominal pain  Secondary Diagnosis:	Bloody diarrhea

## 2023-08-24 NOTE — ED PROVIDER NOTE - CLINICAL SUMMARY MEDICAL DECISION MAKING FREE TEXT BOX
54 y/o female with htn presents with diarrhea with blood x 2 episodes with vomiting and abdominal pain.   Will check abdominal labs, UA, ivf, pepcid, zofran and obtain ct a/p to evaluate for possible colitis. 54 y/o female with htn presents with diarrhea with blood x 2 episodes with vomiting and abdominal pain.   Will check abdominal labs, UA, ivf, pepcid, zofran and obtain ct a/p to evaluate for possible colitis.    labs reviewed and unremarkable.   Ct ap no acute findings.   Repeat cbc no change in hgb/hct.   Pt reassessed and reports feeling better. NO bloody BM.   Shared decision making discussed with patient regarding admission and further evaluation. Pt declined and would like to follow up with GI outpatient. Coordinator consulted to help make GI appointment for patient.   Strict return precautions given.

## 2023-08-24 NOTE — ED ADULT TRIAGE NOTE - CHIEF COMPLAINT QUOTE
pt c/o brb in stool started this morning, + abdominal pain x 3 days. intermittent right arm pain x 1 year. hx: htn

## 2023-08-24 NOTE — ED PROVIDER NOTE - PHYSICAL EXAMINATION
GEN: Awake, alert, interactive, NAD.  HEAD AND NECK: NC/AT. Airway patent. Neck supple.   EYES:  Clear b/l.   ENT: Moist mucus membranes.   CARDIAC: Regular rate, regular rhythm. No evident pedal edema.    RESP/CHEST: Normal respiratory effort with no use of accessory muscles or retractions. Clear throughout on auscultation.  ABD: soft, non-distended, (+) mild genearlized tenderness. No rebound, no guarding.   BACK: No midline spinal TTP. No CVAT.   RECTAL: Chaperoned by nurse Young: (-) hemorrhoid noted. scant bright red blood on glove.   EXTREMITIES: Moving all extremities with no apparent deformities.   SKIN: Warm, dry, intact normal color. No rash.   NEURO: AOx3, CN II-XII grossly intact, no focal deficits.   PSYCH: Appropriate mood and affect.

## 2023-08-24 NOTE — ED PROVIDER NOTE - NSFOLLOWUPINSTRUCTIONS_ED_ALL_ED_FT
Rest, drink plenty of fluids.  Advance activity as tolerated.  Continue all previously prescribed medications as directed.  Follow up with your GI doctor this week- bring copies of your results.  Return to the ER for worsening or persistent symptoms, and/or ANY NEW OR CONCERNING SYMPTOMS. If you have issues obtaining follow up, please call: [phone] to obtain a doctor or specialist who takes your insurance in your area.  You may call [phone] to make an appointment with the internal medicine clinic.

## 2023-08-24 NOTE — ED PROVIDER NOTE - NS ED ATTENDING STATEMENT MOD
This was a shared visit with the FROY. I reviewed and verified the documentation and independently performed the documented:

## 2023-08-24 NOTE — ED ADULT NURSE NOTE - OBJECTIVE STATEMENT
A&OX4 .patient c/o RUQ 6/10 burning abdominal pain . patient states N/V/D  with 2 episodes of bright red stools in diarrhea. denies Fevers/ CP/ SOB at this time A&OX4 .patient c/o RUQ 6/10 burning abdominal pain . patient states N/V/D  with 2 episodes of bright red stools in diarrhea.patient also states pain in left shoulder started over a year ago  denies Fevers/ CP/ SOB at this time pmh hemorrhoid

## 2023-08-24 NOTE — ED PROVIDER NOTE - OBJECTIVE STATEMENT
52 y/o female with htn, history lachelle presents with blood in stool today. Pt reports having diarrhea with bright red blood in stool x 2 episodes today. Pt reports having vomiting few days ago and generalized abdominal pain burning like sensation. Denies fever, chills, chest pain, dyspnea, recent travel, sick contact, urinary symptoms. Pt also reports having right arm pain x 1 year worse with lifting. Denies numbness, tingling, swelling, recent travel. Pt denies being on any blood thinners.

## 2023-08-24 NOTE — ED PROVIDER NOTE - PATIENT PORTAL LINK FT
You can access the FollowMyHealth Patient Portal offered by [facility] by registering at the following website: [email or web address]. By joining [facility]’s FollowMyHealth portal, you will also be able to view your health information using other applications (apps) compatible with our system.

## 2023-08-24 NOTE — ED PROVIDER NOTE - ATTENDING APP SHARED VISIT CONTRIBUTION OF CARE
52yo p/w blood streaked stool x2 today.  Never happened before, no ac use.  No anemic sx.  HDS.  A/w pain so will image.  Very well appearing.  No recent travel, abx, fever.  Plan for labs, ct, meds, reassess.

## 2023-10-19 NOTE — ED ADULT TRIAGE NOTE - MODE OF ARRIVAL
[facility] Spironolactone Counseling: Patient advised regarding risks of diarrhea, abdominal pain, hyperkalemia, birth defects (for female patients), liver toxicity and renal toxicity. The patient may need blood work to monitor liver and kidney function and potassium levels while on therapy. The patient verbalized understanding of the proper use and possible adverse effects of spironolactone.  All of the patient's questions and concerns were addressed.

## 2023-10-23 ENCOUNTER — APPOINTMENT (OUTPATIENT)
Dept: GASTROENTEROLOGY | Facility: CLINIC | Age: 53
End: 2023-10-23

## 2023-11-30 ENCOUNTER — APPOINTMENT (OUTPATIENT)
Dept: PLASTIC SURGERY | Facility: CLINIC | Age: 53
End: 2023-11-30
Payer: COMMERCIAL

## 2023-11-30 ENCOUNTER — APPOINTMENT (OUTPATIENT)
Dept: PLASTIC SURGERY | Facility: CLINIC | Age: 53
End: 2023-11-30

## 2023-11-30 VITALS
HEIGHT: 63 IN | TEMPERATURE: 97.7 F | BODY MASS INDEX: 38.98 KG/M2 | HEART RATE: 85 BPM | SYSTOLIC BLOOD PRESSURE: 139 MMHG | WEIGHT: 220 LBS | OXYGEN SATURATION: 95 % | DIASTOLIC BLOOD PRESSURE: 76 MMHG

## 2023-11-30 DIAGNOSIS — Z82.49 FAMILY HISTORY OF ISCHEMIC HEART DISEASE AND OTHER DISEASES OF THE CIRCULATORY SYSTEM: ICD-10-CM

## 2023-11-30 DIAGNOSIS — Z82.0 FAMILY HISTORY OF EPILEPSY AND OTHER DISEASES OF THE NERVOUS SYSTEM: ICD-10-CM

## 2023-11-30 DIAGNOSIS — R22.0 LOCALIZED SWELLING, MASS AND LUMP, HEAD: ICD-10-CM

## 2023-11-30 DIAGNOSIS — Z83.3 FAMILY HISTORY OF DIABETES MELLITUS: ICD-10-CM

## 2023-11-30 DIAGNOSIS — I10 ESSENTIAL (PRIMARY) HYPERTENSION: ICD-10-CM

## 2023-11-30 DIAGNOSIS — Z83.2 FAMILY HISTORY OF DISEASES OF THE BLOOD AND BLOOD-FORMING ORGANS AND CERTAIN DISORDERS INVOLVING THE IMMUNE MECHANISM: ICD-10-CM

## 2023-11-30 DIAGNOSIS — Z82.5 FAMILY HISTORY OF ASTHMA AND OTHER CHRONIC LOWER RESPIRATORY DISEASES: ICD-10-CM

## 2023-11-30 PROCEDURE — XXXXX: CPT | Mod: 1L

## 2023-11-30 RX ORDER — POLYETHYLENE GLYCOL 3350, SODIUM SULFATE, SODIUM CHLORIDE, POTASSIUM CHLORIDE, ASCORBIC ACID, SODIUM ASCORBATE 7.5-2.691G
100 KIT ORAL
Qty: 1 | Refills: 0 | Status: DISCONTINUED | COMMUNITY
Start: 2017-03-01 | End: 2023-11-30

## 2023-11-30 RX ORDER — HYDRALAZINE HYDROCHLORIDE 50 MG/1
50 TABLET ORAL
Refills: 0 | Status: ACTIVE | COMMUNITY

## 2023-11-30 RX ORDER — NIFEDIPINE 20 MG/1
CAPSULE ORAL
Refills: 0 | Status: ACTIVE | COMMUNITY

## 2023-11-30 RX ORDER — PANTOPRAZOLE 40 MG/1
40 TABLET, DELAYED RELEASE ORAL
Qty: 30 | Refills: 1 | Status: DISCONTINUED | COMMUNITY
Start: 2017-03-01 | End: 2023-11-30

## 2023-11-30 RX ORDER — METOPROLOL TARTRATE 75 MG/1
TABLET, FILM COATED ORAL
Refills: 0 | Status: ACTIVE | COMMUNITY

## 2023-11-30 RX ORDER — PANTOPRAZOLE 40 MG/1
40 TABLET, DELAYED RELEASE ORAL
Refills: 0 | Status: DISCONTINUED | COMMUNITY
End: 2023-11-30

## 2024-05-31 NOTE — ED ADULT TRIAGE NOTE - BEFAST LAST WELL KNOWN
CM note: chart reviewed for potential discharge needs.  Pt is for EGD today.  General surgery consulted.  No discharge needs at this time.    
Noted request for GI evaluation regarding nausea/vomiting/diarrhea.  Pertinent notes/labs reviewed.  Abnormal CT scan suggestive of enterocolitis but not remark of evidence of bowel ischemia.  Nonelevated and trending down lactic acid level.  Stool studies thus far show negative Giardia, negative rotavirus and negative C. difficile with stool culture pending.  Also positive fecal occult blood test.  Significant leukocytosis yesterday with CBC today pending.  Improved electrolytes including BUN and creatinine.  Liver profile appears unremarkable with nonelevated lipase.  Patient is postcholecystectomy.  Review of EMR/epic fails to show previous GI endoscopies -according to notes previous endoscopies possibly at [facility] -will obtain records  Await rest of the stool studies  Monitor lab work  Full consult to follow.  Thank you    David Wesley MD        
Unknown

## 2024-08-08 NOTE — ED PROVIDER NOTE - HEME LYMPH, MLM
Intact family/Intelligence/Interpersonal skills/Positive attitude Intact family/Intelligence/Interpersonal skills/Positive attitude Intact family/Intelligence/Interpersonal skills/Positive attitude No adenopathy or splenomegaly. No cervical or inguinal lymphadenopathy.

## 2024-11-04 NOTE — DISCHARGE NOTE ADULT - IF YOU ARE A SMOKER, IT IS IMPORTANT FOR YOUR HEALTH TO STOP SMOKING. PLEASE BE AWARE THAT SECOND HAND SMOKE IS ALSO HARMFUL.
Is This A New Presentation, Or A Follow-Up?: Rash
Additional History: Waxes and wanes x 5 years. Has steroid to use but wants a non-steroidal long term medication to use.
Statement Selected

## 2025-01-08 NOTE — ED ADULT NURSE NOTE - NSFALLRSKPASTHIST_ED_ALL_ED
Discharge instructions reviewed with patient and patient's  who verbalized understanding. Opportunity for questions provided. Patient ambulatory from ED.   
Patient and  requesting to leave, states that the patient is agitated and ready to go. MD is aware and states to PO challenge patient prior to discharge.     Patient provided with a cup of water for PO challenge.     0210: Patient tolerated PO challenge.   
Patient vomiting - MD aware.   
no

## 2025-01-18 NOTE — ED ADULT TRIAGE NOTE - BMI (KG/M2)
35.4
Assistance with ambulation/Assistance OOB with selected safe patient handling equipment/Communicate Risk of Fall with Harm to all staff/Discuss with provider need for PT consult/Monitor gait and stability/Provide patient with walking aids - walker, cane, crutches/Reinforce activity limits and safety measures with patient and family/Tailored Fall Risk Interventions/Visual Cue: Yellow wristband and red socks/Bed in lowest position, wheels locked, appropriate side rails in place/Call bell, personal items and telephone in reach/Instruct patient to call for assistance before getting out of bed or chair/Non-slip footwear when patient is out of bed/[place] to call system/Physically safe environment - no spills, clutter or unnecessary equipment/Purposeful Proactive Rounding/Room/bathroom lighting operational, light cord in reach

## 2025-03-27 ENCOUNTER — EMERGENCY (EMERGENCY)
Facility: HOSPITAL | Age: 55
LOS: 1 days | Discharge: HOME | End: 2025-03-27
Attending: EMERGENCY MEDICINE | Admitting: EMERGENCY MEDICINE
Payer: COMMERCIAL

## 2025-03-27 VITALS
OXYGEN SATURATION: 95 % | RESPIRATION RATE: 18 BRPM | DIASTOLIC BLOOD PRESSURE: 81 MMHG | WEIGHT: 220.02 LBS | SYSTOLIC BLOOD PRESSURE: 137 MMHG | HEIGHT: 63 IN | HEART RATE: 92 BPM | TEMPERATURE: 99 F

## 2025-03-27 DIAGNOSIS — Z90.49 ACQUIRED ABSENCE OF OTHER SPECIFIED PARTS OF DIGESTIVE TRACT: Chronic | ICD-10-CM

## 2025-03-27 LAB
ALBUMIN SERPL ELPH-MCNC: 4.7 G/DL — SIGNIFICANT CHANGE UP (ref 3.3–5)
ALP SERPL-CCNC: 80 U/L — SIGNIFICANT CHANGE UP (ref 40–120)
ALT FLD-CCNC: 12 U/L — SIGNIFICANT CHANGE UP (ref 4–33)
ANION GAP SERPL CALC-SCNC: 16 MMOL/L — HIGH (ref 7–14)
BASOPHILS # BLD AUTO: 0.04 K/UL — SIGNIFICANT CHANGE UP (ref 0–0.2)
BILIRUB SERPL-MCNC: 0.7 MG/DL — SIGNIFICANT CHANGE UP (ref 0.2–1.2)
BUN SERPL-MCNC: 19 MG/DL — SIGNIFICANT CHANGE UP (ref 7–23)
CALCIUM SERPL-MCNC: 9.6 MG/DL — SIGNIFICANT CHANGE UP (ref 8.4–10.5)
CHLORIDE SERPL-SCNC: 101 MMOL/L — SIGNIFICANT CHANGE UP (ref 98–107)
CREAT SERPL-MCNC: 0.9 MG/DL — SIGNIFICANT CHANGE UP (ref 0.5–1.3)
EGFR: 76 ML/MIN/1.73M2 — SIGNIFICANT CHANGE UP
EGFR: 76 ML/MIN/1.73M2 — SIGNIFICANT CHANGE UP
EOSINOPHIL NFR BLD AUTO: 0.6 % — SIGNIFICANT CHANGE UP (ref 0–6)
FLUAV AG NPH QL: SIGNIFICANT CHANGE UP
FLUBV AG NPH QL: SIGNIFICANT CHANGE UP
GAS PNL BLDV: SIGNIFICANT CHANGE UP
GLUCOSE SERPL-MCNC: 102 MG/DL — HIGH (ref 70–99)
HCT VFR BLD CALC: 46 % — HIGH (ref 34.5–45)
HGB BLD-MCNC: 14.7 G/DL — SIGNIFICANT CHANGE UP (ref 11.5–15.5)
IANC: 5.13 K/UL — SIGNIFICANT CHANGE UP (ref 1.8–7.4)
IMM GRANULOCYTES NFR BLD AUTO: 0.3 % — SIGNIFICANT CHANGE UP (ref 0–0.9)
LYMPHOCYTES # BLD AUTO: 2.22 K/UL — SIGNIFICANT CHANGE UP (ref 1–3.3)
LYMPHOCYTES # BLD AUTO: 25.8 % — SIGNIFICANT CHANGE UP (ref 13–44)
MCHC RBC-ENTMCNC: 30.7 PG — SIGNIFICANT CHANGE UP (ref 27–34)
MCHC RBC-ENTMCNC: 32 G/DL — SIGNIFICANT CHANGE UP (ref 32–36)
MCV RBC AUTO: 96 FL — SIGNIFICANT CHANGE UP (ref 80–100)
MONOCYTES # BLD AUTO: 1.14 K/UL — HIGH (ref 0–0.9)
MONOCYTES NFR BLD AUTO: 13.2 % — SIGNIFICANT CHANGE UP (ref 2–14)
NEUTROPHILS # BLD AUTO: 5.13 K/UL — SIGNIFICANT CHANGE UP (ref 1.8–7.4)
NEUTROPHILS NFR BLD AUTO: 59.6 % — SIGNIFICANT CHANGE UP (ref 43–77)
NRBC # BLD AUTO: 0 K/UL — SIGNIFICANT CHANGE UP (ref 0–0)
NRBC # FLD: 0 K/UL — SIGNIFICANT CHANGE UP (ref 0–0)
NRBC BLD AUTO-RTO: 0 /100 WBCS — SIGNIFICANT CHANGE UP (ref 0–0)
PLATELET # BLD AUTO: 225 K/UL — SIGNIFICANT CHANGE UP (ref 150–400)
POTASSIUM SERPL-MCNC: 3.7 MMOL/L — SIGNIFICANT CHANGE UP (ref 3.5–5.3)
POTASSIUM SERPL-SCNC: 3.7 MMOL/L — SIGNIFICANT CHANGE UP (ref 3.5–5.3)
PROCALCITONIN SERPL-MCNC: 0.04 NG/ML — SIGNIFICANT CHANGE UP (ref 0.02–0.1)
PROT SERPL-MCNC: 7.7 G/DL — SIGNIFICANT CHANGE UP (ref 6–8.3)
RBC # BLD: 4.79 M/UL — SIGNIFICANT CHANGE UP (ref 3.8–5.2)
RBC # FLD: 14 % — SIGNIFICANT CHANGE UP (ref 10.3–14.5)
RSV RNA NPH QL NAA+NON-PROBE: SIGNIFICANT CHANGE UP
SARS-COV-2 RNA SPEC QL NAA+PROBE: SIGNIFICANT CHANGE UP
SODIUM SERPL-SCNC: 140 MMOL/L — SIGNIFICANT CHANGE UP (ref 135–145)
SOURCE RESPIRATORY: SIGNIFICANT CHANGE UP
WBC # BLD: 8.61 K/UL — SIGNIFICANT CHANGE UP (ref 3.8–10.5)
WBC # FLD AUTO: 8.61 K/UL — SIGNIFICANT CHANGE UP (ref 3.8–10.5)

## 2025-03-27 PROCEDURE — 99285 EMERGENCY DEPT VISIT HI MDM: CPT

## 2025-03-27 PROCEDURE — 71046 X-RAY EXAM CHEST 2 VIEWS: CPT | Mod: 26

## 2025-03-27 RX ORDER — ACETAMINOPHEN 500 MG/5ML
975 LIQUID (ML) ORAL ONCE
Refills: 0 | Status: COMPLETED | OUTPATIENT
Start: 2025-03-27 | End: 2025-03-27

## 2025-03-27 RX ORDER — IPRATROPIUM BROMIDE AND ALBUTEROL SULFATE .5; 2.5 MG/3ML; MG/3ML
3 SOLUTION RESPIRATORY (INHALATION) ONCE
Refills: 0 | Status: COMPLETED | OUTPATIENT
Start: 2025-03-27 | End: 2025-03-27

## 2025-03-27 RX ADMIN — Medication 975 MILLIGRAM(S): at 16:01

## 2025-03-27 RX ADMIN — IPRATROPIUM BROMIDE AND ALBUTEROL SULFATE 3 MILLILITER(S): .5; 2.5 SOLUTION RESPIRATORY (INHALATION) at 16:18

## 2025-03-27 RX ADMIN — IPRATROPIUM BROMIDE AND ALBUTEROL SULFATE 3 MILLILITER(S): .5; 2.5 SOLUTION RESPIRATORY (INHALATION) at 16:01

## 2025-03-27 NOTE — ED PROVIDER NOTE - CLINICAL SUMMARY MEDICAL DECISION MAKING FREE TEXT BOX
54-year-old female with past medical history of HTN, smoker one third a pack a day presents to the ED for evaluation of cough.  Patient notes that cough has been productive for the past 3 days, recently seen in urgent care with potential diagnosis of pneumonia/bronchitis.  On exam, lungs sounded very coarse in all lobes.  Patient is saturating at 95% on room air.    Consider pneumonia versus bronchitis versus viral URI.  Will obtain chest x-ray, will obtain flu COVID swab, will treat with DuoNebs and Tylenol and reassess.

## 2025-03-27 NOTE — ED PROVIDER NOTE - PHYSICAL EXAMINATION
Georgia Ferris DO (PGY1)   Physical Exam:    Gen: NAD, AOx3  Head: NCAT  HEENT: EOMI, PEERLA  Lung: Coarse breath sounds  CV: RRR, no murmurs, rubs or gallops  Abd: soft, NT, ND  MSK: no visible deformities, ROM normal in UE/LE, no back pain  Neuro: No focal sensory or motor deficits. Sensation intact to light touch all extremities.  Skin: Warm, well perfused, no rash, no leg swelling  Psych: normal affect, calm

## 2025-03-27 NOTE — ED PROVIDER NOTE - OBJECTIVE STATEMENT
54-year-old female past medical history of HTN, GERD presents to the ED with complaints of cough for the past 3 days.  Notes that she went to urgent care yesterday, was sent to formal without an x-ray or blood work and was told that she may have pneumonia.  Notes that she had a productive cough with phlegm that is occasionally dark and sometimes clear.  Denies any recent fevers, chest pain, nausea, vomiting, shortness of breath.  Endorses smoking one third of a pack of cigarettes a day.

## 2025-03-27 NOTE — ED PROVIDER NOTE - PATIENT PORTAL LINK FT
You can access the FollowMyHealth Patient Portal offered by [facility] by registering at the following website: [email or web address]. By joining [facility]’s FollowMyHealth portal, you will also be able to view your health information using other applications (apps) compatible with our system.

## 2025-03-27 NOTE — ED PROVIDER NOTE - NSFOLLOWUPINSTRUCTIONS_ED_ALL_ED_FT
- You were seen in the emergency department today for cough.    - Lab and imaging results were discussed with you along with your discharge diagnosis.    - Follow up with your doctor in 1 week - bring copies of your results.     - Return to the ED for any new, worsening, or concerning symptoms to you.    - Continue all prescribed medications.    - Take ibuprofen/tylenol as directed as needed for pain.     - Rest and keep yourself hydrated with fluids.

## 2025-03-27 NOTE — ED ADULT TRIAGE NOTE - CHIEF COMPLAINT QUOTE
ambulatory, sent by Dr. Vicente being sent for pneumonia and bronchitis. pt reports has a cough at baseline (smokes cigarettes). endorsing pain upon coughing X couple of days. O2 95% room air in triage.  denies SOB, CP. Phx GERD, HTN

## 2025-03-27 NOTE — ED ADULT NURSE NOTE - OBJECTIVE STATEMENT
patient  Alert & ox4, sent by Dr. Vicente being sent for pneumonia and bronchitis. pt reports has a cough at baseline (smokes cigarettes). endorsing pain upon coughing X couple of days.  pt . evaluated by MD.Placed 20g angiocath right AC., labs drawn and sent. Due meds given as per order.patient will be waiting for results, further evaluation and disposition.  made comfortable.will continue to monitor.

## 2025-03-27 NOTE — ED PROVIDER NOTE - ATTENDING CONTRIBUTION TO CARE
patient is a 54-year-old female with a history of GERD, smokes 1/3 pack/day, here for evaluation of cough.  Patient states that symptoms started 3 days ago on Tuesday.  She went to urgent care yesterday and was evaluated by the urgent care physician and told that she may have bronchitis or pneumonia.  She states lab work, x-ray was not done.  Patient denies any fevers, nausea, vomiting.  She has had some diarrhea.  Patient works for the [employer], reports exposure to a number of people on a daily basis.  She denies any travel or known sick contacts.  She states that she has been having a cough productive with phlegm.  Denies chest pain or shortness of breath.  No abdominal pain.  She states that she has been having dysuria.      VS noted  Gen. no acute distress, Non toxic   HEENT: EOMI, mmm  Lungs: mild rhonchi, coughing with deep breathing  CVS: RRR   Abd; Soft non tender, non distended   Ext: no edema  Skin: no rash  Neuro AAOx3 non focal clear speech  a/p:  cough–patient is a chronic smoker.  Differential includes viral infection versus pneumonia.  Vitals reviewed and patient has a low-grade temp.  Plan for nebulizer treatments, flu/COVID, chest x-ray, labs.  - Janay GUTIERREZ

## 2025-03-27 NOTE — ED ADULT TRIAGE NOTE - NS ED TRIAGE AVPU SCALE
GEN: Pt in NAD, A&O x3.  PSYCH: Affect appropriate.  EYES: Sclera white w/o injection.   ENT: Head NCAT. MMM, posterior pharynx erythematous, b/l tonsillar enlargement L>>R. R sided tonsillar deviation. +mild muffled voice, mild trismus, +exudates on L tonsil. NO excessive salivation, NO evidence  of airway obstruction, NO pus on the floor of the mouth, NO protrusion of the submental area, NO vesicles, NO obvious dental damage. Neck supple FROM. No stridor, trachea midline. +b/l anteiror cervical LAD, +L tonsillar LAD, mildly tender, mobile.  RESP: Speaking in full sentences no evidence of respiratory distress. CTA b/l, no wheezes, rales, or rhonchi.   CARDIAC: RRR, clear distinct S1, S2, no appreciable murmurs.  ABD: Abdomen soft, non-tender, no HSM. No CVAT b/l.  VASC: 2+ radial and dorsalis pedis pulses b/l. No edema or calf tenderness.  SKIN: No rashes on the trunk. Alert-The patient is alert, awake and responds to voice. The patient is oriented to time, place, and person. The triage nurse is able to obtain subjective information.